# Patient Record
Sex: FEMALE | Race: WHITE | NOT HISPANIC OR LATINO | Employment: PART TIME | URBAN - METROPOLITAN AREA
[De-identification: names, ages, dates, MRNs, and addresses within clinical notes are randomized per-mention and may not be internally consistent; named-entity substitution may affect disease eponyms.]

---

## 2017-01-20 ENCOUNTER — COMMUNICATION - HEALTHEAST (OUTPATIENT)
Dept: FAMILY MEDICINE | Facility: CLINIC | Age: 22
End: 2017-01-20

## 2017-01-20 ENCOUNTER — AMBULATORY - HEALTHEAST (OUTPATIENT)
Dept: LAB | Facility: CLINIC | Age: 22
End: 2017-01-20

## 2017-01-20 DIAGNOSIS — F98.8 ADD (ATTENTION DEFICIT DISORDER): ICD-10-CM

## 2017-01-20 DIAGNOSIS — F98.8 ADD (ATTENTION DEFICIT DISORDER) WITHOUT HYPERACTIVITY: ICD-10-CM

## 2017-02-20 ENCOUNTER — COMMUNICATION - HEALTHEAST (OUTPATIENT)
Dept: FAMILY MEDICINE | Facility: CLINIC | Age: 22
End: 2017-02-20

## 2017-02-20 DIAGNOSIS — F98.8 ADD (ATTENTION DEFICIT DISORDER): ICD-10-CM

## 2017-02-21 ENCOUNTER — COMMUNICATION - HEALTHEAST (OUTPATIENT)
Dept: FAMILY MEDICINE | Facility: CLINIC | Age: 22
End: 2017-02-21

## 2017-02-22 ENCOUNTER — COMMUNICATION - HEALTHEAST (OUTPATIENT)
Dept: FAMILY MEDICINE | Facility: CLINIC | Age: 22
End: 2017-02-22

## 2017-02-22 DIAGNOSIS — F98.8 ADD (ATTENTION DEFICIT DISORDER): ICD-10-CM

## 2017-03-27 ENCOUNTER — COMMUNICATION - HEALTHEAST (OUTPATIENT)
Dept: FAMILY MEDICINE | Facility: CLINIC | Age: 22
End: 2017-03-27

## 2017-03-27 DIAGNOSIS — F98.8 ADD (ATTENTION DEFICIT DISORDER): ICD-10-CM

## 2017-05-23 ENCOUNTER — COMMUNICATION - HEALTHEAST (OUTPATIENT)
Dept: FAMILY MEDICINE | Facility: CLINIC | Age: 22
End: 2017-05-23

## 2017-05-23 DIAGNOSIS — F98.8 ADD (ATTENTION DEFICIT DISORDER): ICD-10-CM

## 2017-06-29 ENCOUNTER — OFFICE VISIT - HEALTHEAST (OUTPATIENT)
Dept: FAMILY MEDICINE | Facility: CLINIC | Age: 22
End: 2017-06-29

## 2017-06-29 DIAGNOSIS — F98.8 ADD (ATTENTION DEFICIT DISORDER) WITHOUT HYPERACTIVITY: ICD-10-CM

## 2017-08-04 ENCOUNTER — COMMUNICATION - HEALTHEAST (OUTPATIENT)
Dept: SCHEDULING | Facility: CLINIC | Age: 22
End: 2017-08-04

## 2017-08-04 DIAGNOSIS — F98.8 ADD (ATTENTION DEFICIT DISORDER) WITHOUT HYPERACTIVITY: ICD-10-CM

## 2017-08-07 ENCOUNTER — COMMUNICATION - HEALTHEAST (OUTPATIENT)
Dept: FAMILY MEDICINE | Facility: CLINIC | Age: 22
End: 2017-08-07

## 2017-08-14 ENCOUNTER — RECORDS - HEALTHEAST (OUTPATIENT)
Dept: ADMINISTRATIVE | Facility: OTHER | Age: 22
End: 2017-08-14

## 2017-09-09 ENCOUNTER — COMMUNICATION - HEALTHEAST (OUTPATIENT)
Dept: SCHEDULING | Facility: CLINIC | Age: 22
End: 2017-09-09

## 2017-09-09 DIAGNOSIS — F98.8 ADD (ATTENTION DEFICIT DISORDER) WITHOUT HYPERACTIVITY: ICD-10-CM

## 2017-10-09 ENCOUNTER — COMMUNICATION - HEALTHEAST (OUTPATIENT)
Dept: FAMILY MEDICINE | Facility: CLINIC | Age: 22
End: 2017-10-09

## 2017-10-09 DIAGNOSIS — F98.8 ADD (ATTENTION DEFICIT DISORDER) WITHOUT HYPERACTIVITY: ICD-10-CM

## 2017-11-20 ENCOUNTER — COMMUNICATION - HEALTHEAST (OUTPATIENT)
Dept: FAMILY MEDICINE | Facility: CLINIC | Age: 22
End: 2017-11-20

## 2017-11-20 DIAGNOSIS — F98.8 ADD (ATTENTION DEFICIT DISORDER) WITHOUT HYPERACTIVITY: ICD-10-CM

## 2017-11-24 ENCOUNTER — AMBULATORY - HEALTHEAST (OUTPATIENT)
Dept: FAMILY MEDICINE | Facility: CLINIC | Age: 22
End: 2017-11-24

## 2017-11-24 DIAGNOSIS — F98.8 ADD (ATTENTION DEFICIT DISORDER) WITHOUT HYPERACTIVITY: ICD-10-CM

## 2017-12-28 ENCOUNTER — COMMUNICATION - HEALTHEAST (OUTPATIENT)
Dept: FAMILY MEDICINE | Facility: CLINIC | Age: 22
End: 2017-12-28

## 2017-12-28 DIAGNOSIS — F98.8 ADD (ATTENTION DEFICIT DISORDER) WITHOUT HYPERACTIVITY: ICD-10-CM

## 2018-01-06 ENCOUNTER — COMMUNICATION - HEALTHEAST (OUTPATIENT)
Dept: FAMILY MEDICINE | Facility: CLINIC | Age: 23
End: 2018-01-06

## 2018-01-06 DIAGNOSIS — F98.8 ADD (ATTENTION DEFICIT DISORDER) WITHOUT HYPERACTIVITY: ICD-10-CM

## 2018-01-09 ENCOUNTER — COMMUNICATION - HEALTHEAST (OUTPATIENT)
Dept: FAMILY MEDICINE | Facility: CLINIC | Age: 23
End: 2018-01-09

## 2018-02-07 ENCOUNTER — COMMUNICATION - HEALTHEAST (OUTPATIENT)
Dept: FAMILY MEDICINE | Facility: CLINIC | Age: 23
End: 2018-02-07

## 2018-02-07 DIAGNOSIS — F98.8 ADD (ATTENTION DEFICIT DISORDER) WITHOUT HYPERACTIVITY: ICD-10-CM

## 2018-03-02 ENCOUNTER — OFFICE VISIT - HEALTHEAST (OUTPATIENT)
Dept: FAMILY MEDICINE | Facility: CLINIC | Age: 23
End: 2018-03-02

## 2018-03-02 DIAGNOSIS — N92.0 UNUSUALLY FREQUENT MENSES: ICD-10-CM

## 2018-03-02 DIAGNOSIS — Z11.3 ROUTINE SCREENING FOR STI (SEXUALLY TRANSMITTED INFECTION): ICD-10-CM

## 2018-03-02 DIAGNOSIS — F98.8 ADD (ATTENTION DEFICIT DISORDER) WITHOUT HYPERACTIVITY: ICD-10-CM

## 2018-03-02 LAB — TSH SERPL DL<=0.005 MIU/L-ACNC: 1.16 UIU/ML (ref 0.3–5)

## 2018-03-05 LAB
C TRACH DNA SPEC QL PROBE+SIG AMP: NEGATIVE
N GONORRHOEA DNA SPEC QL NAA+PROBE: NEGATIVE

## 2018-03-21 ENCOUNTER — COMMUNICATION - HEALTHEAST (OUTPATIENT)
Dept: FAMILY MEDICINE | Facility: CLINIC | Age: 23
End: 2018-03-21

## 2018-03-21 DIAGNOSIS — F98.8 ADD (ATTENTION DEFICIT DISORDER) WITHOUT HYPERACTIVITY: ICD-10-CM

## 2018-04-16 ENCOUNTER — COMMUNICATION - HEALTHEAST (OUTPATIENT)
Dept: FAMILY MEDICINE | Facility: CLINIC | Age: 23
End: 2018-04-16

## 2018-04-16 DIAGNOSIS — F98.8 ADD (ATTENTION DEFICIT DISORDER) WITHOUT HYPERACTIVITY: ICD-10-CM

## 2018-05-05 ENCOUNTER — RECORDS - HEALTHEAST (OUTPATIENT)
Dept: ADMINISTRATIVE | Facility: OTHER | Age: 23
End: 2018-05-05

## 2018-05-06 ENCOUNTER — RECORDS - HEALTHEAST (OUTPATIENT)
Dept: ADMINISTRATIVE | Facility: OTHER | Age: 23
End: 2018-05-06

## 2018-05-20 ENCOUNTER — COMMUNICATION - HEALTHEAST (OUTPATIENT)
Dept: FAMILY MEDICINE | Facility: CLINIC | Age: 23
End: 2018-05-20

## 2018-05-20 DIAGNOSIS — F98.8 ADD (ATTENTION DEFICIT DISORDER) WITHOUT HYPERACTIVITY: ICD-10-CM

## 2018-06-27 ENCOUNTER — COMMUNICATION - HEALTHEAST (OUTPATIENT)
Dept: FAMILY MEDICINE | Facility: CLINIC | Age: 23
End: 2018-06-27

## 2018-06-27 DIAGNOSIS — F98.8 ADD (ATTENTION DEFICIT DISORDER) WITHOUT HYPERACTIVITY: ICD-10-CM

## 2018-08-04 ENCOUNTER — COMMUNICATION - HEALTHEAST (OUTPATIENT)
Dept: FAMILY MEDICINE | Facility: CLINIC | Age: 23
End: 2018-08-04

## 2018-08-04 DIAGNOSIS — F98.8 ADD (ATTENTION DEFICIT DISORDER) WITHOUT HYPERACTIVITY: ICD-10-CM

## 2018-09-15 ENCOUNTER — COMMUNICATION - HEALTHEAST (OUTPATIENT)
Dept: FAMILY MEDICINE | Facility: CLINIC | Age: 23
End: 2018-09-15

## 2018-09-15 DIAGNOSIS — F98.8 ADD (ATTENTION DEFICIT DISORDER) WITHOUT HYPERACTIVITY: ICD-10-CM

## 2018-10-20 ENCOUNTER — COMMUNICATION - HEALTHEAST (OUTPATIENT)
Dept: FAMILY MEDICINE | Facility: CLINIC | Age: 23
End: 2018-10-20

## 2018-10-20 DIAGNOSIS — F98.8 ADD (ATTENTION DEFICIT DISORDER) WITHOUT HYPERACTIVITY: ICD-10-CM

## 2018-11-20 ENCOUNTER — COMMUNICATION - HEALTHEAST (OUTPATIENT)
Dept: FAMILY MEDICINE | Facility: CLINIC | Age: 23
End: 2018-11-20

## 2018-11-20 DIAGNOSIS — F98.8 ADD (ATTENTION DEFICIT DISORDER) WITHOUT HYPERACTIVITY: ICD-10-CM

## 2018-12-20 ENCOUNTER — COMMUNICATION - HEALTHEAST (OUTPATIENT)
Dept: FAMILY MEDICINE | Facility: CLINIC | Age: 23
End: 2018-12-20

## 2018-12-20 DIAGNOSIS — F98.8 ADD (ATTENTION DEFICIT DISORDER) WITHOUT HYPERACTIVITY: ICD-10-CM

## 2019-01-30 ENCOUNTER — COMMUNICATION - HEALTHEAST (OUTPATIENT)
Dept: FAMILY MEDICINE | Facility: CLINIC | Age: 24
End: 2019-01-30

## 2019-01-30 DIAGNOSIS — F98.8 ADD (ATTENTION DEFICIT DISORDER) WITHOUT HYPERACTIVITY: ICD-10-CM

## 2019-02-12 ENCOUNTER — OFFICE VISIT - HEALTHEAST (OUTPATIENT)
Dept: FAMILY MEDICINE | Facility: CLINIC | Age: 24
End: 2019-02-12

## 2019-02-12 DIAGNOSIS — Z79.899 CONTROLLED SUBSTANCE AGREEMENT SIGNED: ICD-10-CM

## 2019-02-12 DIAGNOSIS — F98.8 ADD (ATTENTION DEFICIT DISORDER) WITHOUT HYPERACTIVITY: ICD-10-CM

## 2019-03-12 ENCOUNTER — COMMUNICATION - HEALTHEAST (OUTPATIENT)
Dept: FAMILY MEDICINE | Facility: CLINIC | Age: 24
End: 2019-03-12

## 2019-03-12 DIAGNOSIS — F98.8 ADD (ATTENTION DEFICIT DISORDER) WITHOUT HYPERACTIVITY: ICD-10-CM

## 2019-05-02 ENCOUNTER — COMMUNICATION - HEALTHEAST (OUTPATIENT)
Dept: FAMILY MEDICINE | Facility: CLINIC | Age: 24
End: 2019-05-02

## 2019-05-02 DIAGNOSIS — F98.8 ADD (ATTENTION DEFICIT DISORDER) WITHOUT HYPERACTIVITY: ICD-10-CM

## 2019-05-10 ENCOUNTER — AMBULATORY - HEALTHEAST (OUTPATIENT)
Dept: LAB | Facility: CLINIC | Age: 24
End: 2019-05-10

## 2019-05-10 DIAGNOSIS — Z79.899 CONTROLLED SUBSTANCE AGREEMENT SIGNED: ICD-10-CM

## 2019-05-12 LAB
AMPHETAMINES, URN, SCREEN: POSITIVE NG/ML
BARBITURATES, URN, SCREEN: NEGATIVE NG/ML
BENZODIAZEPINES, URN, SCREEN: NEGATIVE NG/ML
BUPRENORPHINE, URN, SCREEN: NEGATIVE NG/ML
CARISOPRODOL, URN, SCREEN: NEGATIVE NG/ML
COCAINE, URN, SCREEN: NEGATIVE NG/ML
CREAT UR-MCNC: 174.2 MG/DL (ref 20–400)
ETHYL GLUCURONIDE SCREEN W/RFLX, URINE: POSITIVE NG/ML
FENTANYL, URN, SCREEN: NEGATIVE NG/ML
MEPERIDINE, URN, SCREEN: NEGATIVE NG/ML
METHADONE, URN, SCREEN: NEGATIVE NG/ML
OPIATES, URN, SCREEN: NEGATIVE NG/ML
OXYCODONE/OXYMORPHONE, URN, SCREEN: NEGATIVE NG/ML
PHENCYCLIDINE, URN, SCREEN: NEGATIVE NG/ML
PROPOXYPHENE, URN, SCREEN: NEGATIVE NG/ML
SCREEN, URINE INTERPRETATION: NORMAL
TAPENTADOL, URN, SCREEN: NEGATIVE NG/ML
THC, URN, SCREEN: NEGATIVE NG/ML
TRAMADOL, URN, SCREEN: NEGATIVE NG/ML
ZOLPIDEM, URN, SCREEN: NEGATIVE NG/ML

## 2019-05-14 LAB
ETHYL GLUCURONIDE UR CFM-MCNC: NORMAL NG/ML
ETHYL SULFATE UR CFM-MCNC: NORMAL NG/ML

## 2019-05-16 LAB
AMPHET UR-MCNC: 1778 NG/ML
MDA UR-MCNC: <200 NG/ML
MDEA UR-MCNC: <200 NG/ML
MDMA UR-MCNC: <200 NG/ML
METHAMPHET UR-MCNC: <200 NG/ML
PHENTERMINE UR CFM-MCNC: <200 NG/ML

## 2019-06-08 ENCOUNTER — COMMUNICATION - HEALTHEAST (OUTPATIENT)
Dept: FAMILY MEDICINE | Facility: CLINIC | Age: 24
End: 2019-06-08

## 2019-06-08 DIAGNOSIS — F98.8 ADD (ATTENTION DEFICIT DISORDER) WITHOUT HYPERACTIVITY: ICD-10-CM

## 2019-07-26 ENCOUNTER — COMMUNICATION - HEALTHEAST (OUTPATIENT)
Dept: FAMILY MEDICINE | Facility: CLINIC | Age: 24
End: 2019-07-26

## 2019-07-26 DIAGNOSIS — F98.8 ADD (ATTENTION DEFICIT DISORDER) WITHOUT HYPERACTIVITY: ICD-10-CM

## 2019-08-22 ENCOUNTER — COMMUNICATION - HEALTHEAST (OUTPATIENT)
Dept: SCHEDULING | Facility: CLINIC | Age: 24
End: 2019-08-22

## 2019-09-02 ENCOUNTER — COMMUNICATION - HEALTHEAST (OUTPATIENT)
Dept: FAMILY MEDICINE | Facility: CLINIC | Age: 24
End: 2019-09-02

## 2019-09-02 DIAGNOSIS — F98.8 ADD (ATTENTION DEFICIT DISORDER) WITHOUT HYPERACTIVITY: ICD-10-CM

## 2019-10-07 ENCOUNTER — COMMUNICATION - HEALTHEAST (OUTPATIENT)
Dept: FAMILY MEDICINE | Facility: CLINIC | Age: 24
End: 2019-10-07

## 2019-10-07 DIAGNOSIS — F98.8 ADD (ATTENTION DEFICIT DISORDER) WITHOUT HYPERACTIVITY: ICD-10-CM

## 2019-11-13 ENCOUNTER — COMMUNICATION - HEALTHEAST (OUTPATIENT)
Dept: FAMILY MEDICINE | Facility: CLINIC | Age: 24
End: 2019-11-13

## 2019-11-13 DIAGNOSIS — F98.8 ADD (ATTENTION DEFICIT DISORDER) WITHOUT HYPERACTIVITY: ICD-10-CM

## 2019-11-13 DIAGNOSIS — Z79.899 CONTROLLED SUBSTANCE AGREEMENT SIGNED: ICD-10-CM

## 2019-12-16 ENCOUNTER — COMMUNICATION - HEALTHEAST (OUTPATIENT)
Dept: FAMILY MEDICINE | Facility: CLINIC | Age: 24
End: 2019-12-16

## 2019-12-16 DIAGNOSIS — F98.8 ADD (ATTENTION DEFICIT DISORDER) WITHOUT HYPERACTIVITY: ICD-10-CM

## 2020-01-19 ENCOUNTER — COMMUNICATION - HEALTHEAST (OUTPATIENT)
Dept: FAMILY MEDICINE | Facility: CLINIC | Age: 25
End: 2020-01-19

## 2020-01-19 DIAGNOSIS — F98.8 ADD (ATTENTION DEFICIT DISORDER) WITHOUT HYPERACTIVITY: ICD-10-CM

## 2020-02-28 ENCOUNTER — OFFICE VISIT - HEALTHEAST (OUTPATIENT)
Dept: FAMILY MEDICINE | Facility: CLINIC | Age: 25
End: 2020-02-28

## 2020-02-28 DIAGNOSIS — Z12.4 PAPANICOLAOU SMEAR FOR CERVICAL CANCER SCREENING: ICD-10-CM

## 2020-02-28 DIAGNOSIS — Z79.899 CONTROLLED SUBSTANCE AGREEMENT SIGNED: ICD-10-CM

## 2020-02-28 DIAGNOSIS — Z00.00 ROUTINE GENERAL MEDICAL EXAMINATION AT A HEALTH CARE FACILITY: ICD-10-CM

## 2020-02-28 DIAGNOSIS — F98.8 ADD (ATTENTION DEFICIT DISORDER) WITHOUT HYPERACTIVITY: ICD-10-CM

## 2020-02-28 DIAGNOSIS — Z11.3 SCREENING FOR STD (SEXUALLY TRANSMITTED DISEASE): ICD-10-CM

## 2020-02-28 LAB — HIV 1+2 AB+HIV1 P24 AG SERPL QL IA: NEGATIVE

## 2020-02-28 ASSESSMENT — MIFFLIN-ST. JEOR: SCORE: 1354.83

## 2020-03-01 LAB
6MAM UR QL: NOT DETECTED
7AMINOCLONAZEPAM UR QL: NOT DETECTED
A-OH ALPRAZ UR QL: NOT DETECTED
ALPRAZ UR QL: NOT DETECTED
AMPHET UR QL SCN: PRESENT
BARBITURATES UR QL: NOT DETECTED
BUPRENORPHINE UR QL: NOT DETECTED
BZE UR QL: NOT DETECTED
CARBOXYTHC UR QL: NOT DETECTED
CARISOPRODOL UR QL: NOT DETECTED
CLONAZEPAM UR QL: NOT DETECTED
CODEINE UR QL: NOT DETECTED
CREAT UR-MCNC: 80.9 MG/DL (ref 20–400)
DIAZEPAM UR QL: NOT DETECTED
ETHYL GLUCURONIDE UR QL: PRESENT
FENTANYL UR QL: NOT DETECTED
HYDROCODONE UR QL: NOT DETECTED
HYDROMORPHONE UR QL: NOT DETECTED
LORAZEPAM UR QL: NOT DETECTED
MDA UR QL: NOT DETECTED
MDEA UR QL: NOT DETECTED
MDMA UR QL: NOT DETECTED
ME-PHENIDATE UR QL: NOT DETECTED
MEPERIDINE UR QL: NOT DETECTED
METHADONE UR QL: NOT DETECTED
METHAMPHET UR QL: NOT DETECTED
MIDAZOLAM UR QL SCN: NOT DETECTED
MORPHINE UR QL: NOT DETECTED
NORBUPRENORPHINE UR QL CFM: NOT DETECTED
NORDIAZEPAM UR QL: NOT DETECTED
NORFENTANYL UR QL: NOT DETECTED
NORHYDROCODONE UR QL CFM: NOT DETECTED
NOROXYCODONE UR QL CFM: NOT DETECTED
NOROXYMORPHONE UR QL SCN: NOT DETECTED
OXAZEPAM UR QL: NOT DETECTED
OXYCODONE UR QL: NOT DETECTED
OXYMORPHONE UR QL: NOT DETECTED
PATHOLOGY STUDY: NORMAL
PCP UR QL: NOT DETECTED
PHENTERMINE UR QL: NOT DETECTED
PROPOXYPH UR QL: NOT DETECTED
SERVICE CMNT-IMP: NORMAL
TAPENTADOL UR QL SCN: NOT DETECTED
TAPENTADOL UR QL SCN: NOT DETECTED
TEMAZEPAM UR QL: NOT DETECTED
TRAMADOL UR QL: NOT DETECTED
ZOLPIDEM UR QL: NOT DETECTED

## 2020-03-02 LAB
BKR LAB AP ABNORMAL BLEEDING: NORMAL
BKR LAB AP BIRTH CONTROL/HORMONES: NORMAL
BKR LAB AP CERVICAL APPEARANCE: NORMAL
BKR LAB AP GYN ADEQUACY: NORMAL
BKR LAB AP GYN INTERPRETATION: NORMAL
BKR LAB AP HPV REFLEX: NO
BKR LAB AP LMP: NORMAL
BKR LAB AP PATIENT STATUS: NORMAL
BKR LAB AP PREVIOUS ABNORMAL: NO
BKR LAB AP PREVIOUS NORMAL: 2016
C TRACH DNA SPEC QL PROBE+SIG AMP: NEGATIVE
HIGH RISK?: NO
N GONORRHOEA DNA SPEC QL NAA+PROBE: NEGATIVE
PATH REPORT.COMMENTS IMP SPEC: NORMAL
RESULT FLAG (HE HISTORICAL CONVERSION): NORMAL

## 2020-03-05 ENCOUNTER — OFFICE VISIT - HEALTHEAST (OUTPATIENT)
Dept: FAMILY MEDICINE | Facility: CLINIC | Age: 25
End: 2020-03-05

## 2020-03-05 DIAGNOSIS — Z30.46 ENCOUNTER FOR NEXPLANON REMOVAL: ICD-10-CM

## 2020-03-05 DIAGNOSIS — Z30.011 ENCOUNTER FOR INITIAL PRESCRIPTION OF CONTRACEPTIVE PILLS: ICD-10-CM

## 2020-04-01 ENCOUNTER — COMMUNICATION - HEALTHEAST (OUTPATIENT)
Dept: FAMILY MEDICINE | Facility: CLINIC | Age: 25
End: 2020-04-01

## 2020-04-01 DIAGNOSIS — F98.8 ADD (ATTENTION DEFICIT DISORDER) WITHOUT HYPERACTIVITY: ICD-10-CM

## 2020-04-20 ENCOUNTER — COMMUNICATION - HEALTHEAST (OUTPATIENT)
Dept: FAMILY MEDICINE | Facility: CLINIC | Age: 25
End: 2020-04-20

## 2020-04-20 DIAGNOSIS — F98.8 ADD (ATTENTION DEFICIT DISORDER) WITHOUT HYPERACTIVITY: ICD-10-CM

## 2020-04-29 ENCOUNTER — COMMUNICATION - HEALTHEAST (OUTPATIENT)
Dept: FAMILY MEDICINE | Facility: CLINIC | Age: 25
End: 2020-04-29

## 2020-04-29 DIAGNOSIS — Z30.011 ENCOUNTER FOR INITIAL PRESCRIPTION OF CONTRACEPTIVE PILLS: ICD-10-CM

## 2020-04-30 RX ORDER — NORGESTIMATE AND ETHINYL ESTRADIOL
KIT
Qty: 28 TABLET | Refills: 1 | Status: SHIPPED | OUTPATIENT
Start: 2020-04-30 | End: 2021-09-07

## 2020-06-24 ENCOUNTER — COMMUNICATION - HEALTHEAST (OUTPATIENT)
Dept: FAMILY MEDICINE | Facility: CLINIC | Age: 25
End: 2020-06-24

## 2020-06-24 DIAGNOSIS — F98.8 ADD (ATTENTION DEFICIT DISORDER) WITHOUT HYPERACTIVITY: ICD-10-CM

## 2020-09-04 ENCOUNTER — COMMUNICATION - HEALTHEAST (OUTPATIENT)
Dept: FAMILY MEDICINE | Facility: CLINIC | Age: 25
End: 2020-09-04

## 2020-09-04 DIAGNOSIS — F98.8 ADD (ATTENTION DEFICIT DISORDER) WITHOUT HYPERACTIVITY: ICD-10-CM

## 2020-10-09 ENCOUNTER — COMMUNICATION - HEALTHEAST (OUTPATIENT)
Dept: FAMILY MEDICINE | Facility: CLINIC | Age: 25
End: 2020-10-09

## 2020-10-09 DIAGNOSIS — F98.8 ADD (ATTENTION DEFICIT DISORDER) WITHOUT HYPERACTIVITY: ICD-10-CM

## 2020-10-12 ENCOUNTER — COMMUNICATION - HEALTHEAST (OUTPATIENT)
Dept: FAMILY MEDICINE | Facility: CLINIC | Age: 25
End: 2020-10-12

## 2020-10-12 DIAGNOSIS — F98.8 ADD (ATTENTION DEFICIT DISORDER) WITHOUT HYPERACTIVITY: ICD-10-CM

## 2020-11-09 ENCOUNTER — COMMUNICATION - HEALTHEAST (OUTPATIENT)
Dept: FAMILY MEDICINE | Facility: CLINIC | Age: 25
End: 2020-11-09

## 2020-11-09 DIAGNOSIS — F98.8 ADD (ATTENTION DEFICIT DISORDER) WITHOUT HYPERACTIVITY: ICD-10-CM

## 2020-11-13 ENCOUNTER — COMMUNICATION - HEALTHEAST (OUTPATIENT)
Dept: FAMILY MEDICINE | Facility: CLINIC | Age: 25
End: 2020-11-13

## 2020-11-13 DIAGNOSIS — F98.8 ADD (ATTENTION DEFICIT DISORDER) WITHOUT HYPERACTIVITY: ICD-10-CM

## 2020-12-07 ENCOUNTER — VIRTUAL VISIT (OUTPATIENT)
Dept: FAMILY MEDICINE | Facility: OTHER | Age: 25
End: 2020-12-07

## 2020-12-08 ENCOUNTER — AMBULATORY - HEALTHEAST (OUTPATIENT)
Dept: FAMILY MEDICINE | Facility: CLINIC | Age: 25
End: 2020-12-08

## 2020-12-08 DIAGNOSIS — Z20.822 SUSPECTED COVID-19 VIRUS INFECTION: ICD-10-CM

## 2020-12-08 NOTE — PROGRESS NOTES
"Date: 2020 18:08:51  Clinician: Marco A Verdugo  Clinician NPI: [id number]  Patient: Aidee Cortes  Patient : 1995  Patient Address: [address], [place] [address]  Patient Phone: [phone]  Visit Protocol: URI  Patient Summary:  Aidee is a 25 year old ( : 1995 ) female who initiated a [facility] Visit for COVID-19 (Coronavirus) evaluation and screening. When asked the question \"Please sign me up to receive news, health information and promotions from [facility].\", Aidee responded \"No\".    Aidee states her symptoms started 1-2 days ago.   Her symptoms consist of a headache, nasal congestion, rhinitis, facial pain or pressure, myalgia, chills, malaise, and a sore throat. She is experiencing mild difficulty breathing with activities but can speak normally in full sentences. Aidee also feels feverish.   Symptom details     Nasal secretions: The color of her mucus is green, blood-tinged, yellow, and white.    Sore throat: Aidee reports having moderate throat pain (4-6 on a 10 point pain scale), does not have exudate on her tonsils, and can swallow liquids. The lymph nodes in her neck are not enlarged. A rash has not appeared on the skin since the sore throat started.     Temperature: Her current temperature is 102.0 degrees Fahrenheit. Aidee has had a temperature over 100 degrees Fahrenheit for 1-2 days.     Facial pain or pressure: The facial pain or pressure feels worse when bending over or leaning forward.     Headache: She states the headache is moderate (4-6 on a 10 point pain scale).      Aidee denies having ear pain, wheezing, enlarged lymph nodes, cough, nausea, vomiting, teeth pain, ageusia, diarrhea, and anosmia. She also denies taking antibiotic medication in the past month, having recent facial or sinus surgery in the past 60 days, and having a sinus infection within the past year.   Precipitating events  Within the past week, Aidee has not been exposed to someone with " strep throat. She has not recently been exposed to someone with influenza. Aidee has been in close contact with the following high risk individuals: adults 65 or older.   Pertinent COVID-19 (Coronavirus) information  Aidee does not work or volunteer as healthcare worker or a . In the past 14 days, Aidee has not worked or volunteered at a healthcare facility or group living setting.   In the past 14 days, she also has not lived in a congregate living setting.   Aidee has not had a close contact with a laboratory-confirmed COVID-19 patient within 14 days of symptom onset.    Since December 2019, Aidee has not been tested for COVID-19 and has not had upper respiratory infection or influenza-like illness.   Pertinent medical history  She has not been told by her provider to avoid NSAIDs.   Aidee does not get yeast infections when she takes antibiotics.   Aidee does not have diabetes. She denies having immunosuppressive conditions (e.g., chemotherapy, HIV, organ transplant, long-term use of steroids or other immunosuppressive medications, splenectomy). She does not have severe COPD and congestive heart failure. She does not have asthma.   Aidee needs a return to work/school note.   Weight: 135 lbs   Aidee does not smoke or use smokeless tobacco.   She denies pregnancy and denies breastfeeding. She has menstruated in the past month.   Weight: 135 lbs    MEDICATIONS: Tylenol oral, Ibuprofen IB oral, Adderall oral, Adderall XR oral, ALLERGIES: NKDA  Clinician Response:  Dear Aidee,   Your symptoms show that you may have coronavirus (COVID-19). This illness can cause fever, cough and trouble breathing. Many people get a mild case and get better on their own. Some people can get very sick.  What should I do?  We would like to test you for this virus.   1. Please call [phone] to schedule your visit. Explain that you were referred by [facility] to have a COVID-19 test. Be ready to share your [facility]  "visit ID number.  * If you need to schedule in [facility] please call [phone] or for [employer] employees please call [phone].  * If you need to schedule in the [place] area please call [phone]. [place] employees call [phone].  The following will serve as your written order for this COVID Test, ordered by me, for the indication of suspected COVID [Z20.828]: The test will be ordered in [employer], our electronic health record, after you are scheduled. It will show as ordered and authorized by Rylan Pineda MD.  Order: COVID-19 (Coronavirus) PCR for SYMPTOMATIC testing from [facility].   2. When it's time for your COVID test:  Stay at least 6 feet away from others. (If someone will drive you to your test, stay in the backseat, as far away from the  as you can.)   Cover your mouth and nose with a mask, tissue or washcloth.  Go straight to the testing site. Don't make any stops on the way there or back.      3.Starting now: Stay home and away from others (self-isolate) until:   You've had no fever---and no medicine that reduces fever---for one full day (24 hours). And...   Your other symptoms have gotten better. For example, your cough or breathing has improved. And...   At least 10 days have passed since your symptoms started.       During this time, don't leave the house except for testing or medical care.   Stay in your own room, even for meals. Use your own bathroom if you can.   Stay away from others in your home. No hugging, kissing or shaking hands. No visitors.  Don't go to work, school or anywhere else.    Clean \"high touch\" surfaces often (doorknobs, counters, handles, etc.). Use a household cleaning spray or wipes. You'll find a full list of  on the EPA website: www.epa.gov/pesticide-registration/list-n-disinfectants-use-against-sars-cov-2.   Cover your mouth and nose with a mask, tissue or washcloth to avoid spreading germs.  Wash your hands and face often. Use soap and water.  " Caregivers in these groups are at risk for severe illness due to COVID-19:  o People 65 years and older  o People who live in a nursing home or long-term care facility  o People with chronic disease (lung, heart, cancer, diabetes, kidney, liver, immunologic)  o People who have a weakened immune system, including those who:   Are in cancer treatment  Take medicine that weakens the immune system, such as corticosteroids  Had a bone marrow or organ transplant  Have an immune deficiency  Have poorly controlled HIV or AIDS  Are obese (body mass index of 40 or higher)  Smoke regularly   o Caregivers should wear gloves while washing dishes, handling laundry and cleaning bedrooms and bathrooms.  o Use caution when washing and drying laundry: Don't shake dirty laundry, and use the warmest water setting that you can.  o For more tips, go to www.cdc.gov/coronavirus/2019-ncov/downloads/10Things.pdf.    How can I take care of myself?    Get lots of rest. Drink extra fluids (unless a doctor has told you not to).   Take Tylenol (acetaminophen) for fever or pain. If you have liver or kidney problems, ask your family doctor if it's okay to take Tylenol.   Adults can take either:    650 mg (two 325 mg pills) every 4 to 6 hours, or...   1,000 mg (two 500 mg pills) every 8 hours as needed.    Note: Don't take more than 3,000 mg in one day. Acetaminophen is found in many medicines (both prescribed and over-the-counter medicines). Read all labels to be sure you don't take too much.   For children, check the Tylenol bottle for the right dose. The dose is based on the child's age or weight.    If you have other health problems (like cancer, heart failure, an organ transplant or severe kidney disease): Call your specialty clinic if you don't feel better in the next 2 days.       Know when to call 911. Emergency warning signs include:    Trouble breathing or shortness of breath Pain or pressure in the chest that doesn't go away Feeling  confused like you haven't felt before, or not being able to wake up Bluish-colored lips or face.  Where can I get more information?   [facility] -- About COVID-19: [email or web address]   CDC -- What to Do If You're Sick: www.cdc.gov/coronavirus/2019-ncov/about/steps-when-sick.html   CDC -- Ending Home Isolation: www.cdc.gov/coronavirus/2019-ncov/hcp/disposition-in-home-patients.html   [facility] -- Caring for Someone: www.cdc.gov/coronavirus/2019-ncov/if-you-are-sick/care-for-someone.html   [facility] -- Interim Guidance for Hospital Discharge to Home: www.[facility].[place]/diseases/coronavirus/hcp/hospdischarge.pdf   [facility] clinical trials (COVID-19 research studies): clinicalaffairs.[facility]-clinical-trials    Below are the COVID-19 hotlines at the [facility] of [facility] ([facility]). Interpreters are available.    For health questions: Call 212-418-4435 or 1-233.978.1630 (7 a.m. to 7 p.m.) For questions about schools and childcare: Call 462-710-5350 or 1-675.740.9761 (7 a.m. to 7 p.m.)    Diagnosis: Contact with and (suspected) exposure to other viral communicable diseases  Diagnosis ICD: Z20.828

## 2020-12-09 ENCOUNTER — RECORDS - HEALTHEAST (OUTPATIENT)
Dept: ADMINISTRATIVE | Facility: OTHER | Age: 25
End: 2020-12-09

## 2020-12-10 ENCOUNTER — COMMUNICATION - HEALTHEAST (OUTPATIENT)
Dept: SCHEDULING | Facility: CLINIC | Age: 25
End: 2020-12-10

## 2020-12-19 ENCOUNTER — COMMUNICATION - HEALTHEAST (OUTPATIENT)
Dept: FAMILY MEDICINE | Facility: CLINIC | Age: 25
End: 2020-12-19

## 2020-12-19 DIAGNOSIS — F98.8 ADD (ATTENTION DEFICIT DISORDER) WITHOUT HYPERACTIVITY: ICD-10-CM

## 2020-12-20 ENCOUNTER — HEALTH MAINTENANCE LETTER (OUTPATIENT)
Age: 25
End: 2020-12-20

## 2021-01-24 ENCOUNTER — COMMUNICATION - HEALTHEAST (OUTPATIENT)
Dept: FAMILY MEDICINE | Facility: CLINIC | Age: 26
End: 2021-01-24

## 2021-01-24 DIAGNOSIS — F98.8 ADD (ATTENTION DEFICIT DISORDER) WITHOUT HYPERACTIVITY: ICD-10-CM

## 2021-03-09 ENCOUNTER — OFFICE VISIT - HEALTHEAST (OUTPATIENT)
Dept: FAMILY MEDICINE | Facility: CLINIC | Age: 26
End: 2021-03-09

## 2021-03-09 DIAGNOSIS — F98.8 ADD (ATTENTION DEFICIT DISORDER) WITHOUT HYPERACTIVITY: ICD-10-CM

## 2021-03-09 DIAGNOSIS — Z00.00 ROUTINE GENERAL MEDICAL EXAMINATION AT A HEALTH CARE FACILITY: ICD-10-CM

## 2021-03-09 DIAGNOSIS — Z79.899 CONTROLLED SUBSTANCE AGREEMENT SIGNED: ICD-10-CM

## 2021-03-09 DIAGNOSIS — Z11.3 SCREEN FOR STD (SEXUALLY TRANSMITTED DISEASE): ICD-10-CM

## 2021-03-09 LAB
AMPHETAMINES UR QL SCN: ABNORMAL
BARBITURATES UR QL: ABNORMAL
BENZODIAZ UR QL: ABNORMAL
CANNABINOIDS UR QL SCN: ABNORMAL
COCAINE UR QL: ABNORMAL
CREAT UR-MCNC: 195.2 MG/DL
METHADONE UR QL SCN: ABNORMAL
OPIATES UR QL SCN: ABNORMAL
OXYCODONE UR QL: ABNORMAL
PCP UR QL SCN: ABNORMAL

## 2021-03-11 LAB
C TRACH DNA SPEC QL PROBE+SIG AMP: NEGATIVE
N GONORRHOEA DNA SPEC QL NAA+PROBE: NEGATIVE

## 2021-04-19 ENCOUNTER — COMMUNICATION - HEALTHEAST (OUTPATIENT)
Dept: FAMILY MEDICINE | Facility: CLINIC | Age: 26
End: 2021-04-19

## 2021-04-19 DIAGNOSIS — F98.8 ADD (ATTENTION DEFICIT DISORDER) WITHOUT HYPERACTIVITY: ICD-10-CM

## 2021-05-25 ENCOUNTER — COMMUNICATION - HEALTHEAST (OUTPATIENT)
Dept: FAMILY MEDICINE | Facility: CLINIC | Age: 26
End: 2021-05-25

## 2021-05-25 DIAGNOSIS — F98.8 ADD (ATTENTION DEFICIT DISORDER) WITHOUT HYPERACTIVITY: ICD-10-CM

## 2021-05-25 RX ORDER — DEXTROAMPHETAMINE SACCHARATE, AMPHETAMINE ASPARTATE, DEXTROAMPHETAMINE SULFATE AND AMPHETAMINE SULFATE 1.25; 1.25; 1.25; 1.25 MG/1; MG/1; MG/1; MG/1
TABLET ORAL
Qty: 30 TABLET | Refills: 0 | Status: SHIPPED | OUTPATIENT
Start: 2021-05-25 | End: 2022-02-16

## 2021-05-25 RX ORDER — DEXTROAMPHETAMINE SACCHARATE, AMPHETAMINE ASPARTATE MONOHYDRATE, DEXTROAMPHETAMINE SULFATE AND AMPHETAMINE SULFATE 3.75; 3.75; 3.75; 3.75 MG/1; MG/1; MG/1; MG/1
15 CAPSULE, EXTENDED RELEASE ORAL DAILY
Qty: 30 CAPSULE | Refills: 0 | Status: SHIPPED | OUTPATIENT
Start: 2021-05-25 | End: 2022-02-16

## 2021-05-27 VITALS
SYSTOLIC BLOOD PRESSURE: 124 MMHG | OXYGEN SATURATION: 98 % | TEMPERATURE: 98.1 F | HEART RATE: 118 BPM | DIASTOLIC BLOOD PRESSURE: 62 MMHG

## 2021-05-28 NOTE — TELEPHONE ENCOUNTER
Please let her know I filled a month, but she needs to come in for a urine test before I will provide further refills

## 2021-05-28 NOTE — TELEPHONE ENCOUNTER
"Controlled Substance Refill Request  #1 of 2  Medication Name:  Adderall XR 15 mg; daily.  Start 3/19/19 until Wed 3/18/2020; no further refills without office visit  Date Last Fill: 3/19/2019  Quantity: 30  Number of refills: 0  Controlled Substance Agreement on File: Yes: 2/12/2019   Early Request:  No  Plan Last OV:   Last office visit addressing: ADD  Date: 2/12/2019  Copy/Past OV Follow-up Plan:    \"Return in about 6 months (around 8/12/2019) for Annual physical.\"    Desire Cruz RN, BAN, Nurse Advisor, [facility]-7[facility]      Controlled Substance Refill Request  #2 of 2  Medication Name:  Adderall 5 mg; take one in the afternoon as needed; no further refills without office visit  Date Last Fill: 3/19/2019  Quantity: 30  Number of refills: 0  Controlled Substance Agreement on File: Yes: 2/12/2019  Early Request:  No  Plan Last OV:   Last office visit addressing: ADD  Date: 2/12/2019  Copy/Past OV Follow-up Plan:    \"Return in about 6 months (around 8/12/2019) for Annual physical.    Desire Cruz RN, BAN, Nurse Advisor, [facility]    "

## 2021-05-29 NOTE — TELEPHONE ENCOUNTER
Controlled Substance Refill Request  Medication:   Requested Prescriptions     Pending Prescriptions Disp Refills     dextroamphetamine-amphetamine (ADDERALL XR) 15 MG 24 hr capsule 30 capsule 0     Sig: Take 1 capsule (15 mg total) by mouth daily.     dextroamphetamine-amphetamine (ADDERALL) 5 mg Tab tablet 30 tablet 0     Sig: Take 1 tablet in the afternoon as needed     Date Last Fill: 5/3/19 both  Pharmacy: [facility]   Submit electronically to pharmacy  Controlled Substance Agreement on File:   Encounter-Level CSA Scan Date - 12/09/2016:    Scan on 12/9/2016 (below)         Last office visit: Last office visit pertaining to requested medication was .

## 2021-05-29 NOTE — TELEPHONE ENCOUNTER
Requested Prescriptions     Pending Prescriptions Disp Refills     dextroamphetamine-amphetamine (ADDERALL XR) 15 MG 24 hr capsule 30 capsule 0     Sig: Take 1 capsule (15 mg total) by mouth daily.     dextroamphetamine-amphetamine (ADDERALL) 5 mg Tab tablet 30 tablet 0     Sig: Take 1 tablet in the afternoon as needed     LM for pt to call clinic to call clinic to schedule a med check.

## 2021-05-30 NOTE — TELEPHONE ENCOUNTER
Orders being requested: Amphetamines, Urine, Quantitative      Reason service is needed/diagnosis:   Medication Refill     When are orders needed by:   ASAP   Where to send Orders: Place in Chart     Okay to leave detailed message?  Yes  Please call patient per approval so patient can obtain refill request .  Thank You

## 2021-05-30 NOTE — TELEPHONE ENCOUNTER
Controlled Substance Refill Request  Medication Name:   Requested Prescriptions     Pending Prescriptions Disp Refills     dextroamphetamine-amphetamine (ADDERALL XR) 15 MG 24 hr capsule 30 capsule 0     Sig: Take 1 capsule (15 mg total) by mouth daily.     dextroamphetamine-amphetamine (ADDERALL) 5 mg Tab tablet 30 tablet 0     Sig: Take 1 tablet in the afternoon as needed     Date Last Fill: 06/12/19  Pharmacy:  [place], [place] - [address]    Submit electronically to pharmacy  Controlled Substance Agreement Date Scanned:   Encounter-Level CSA Scan Date - 12/09/2016:    Scan on 12/9/2016 (below)         Last office visit with prescriber/PCP: 2/12/2019 Khushi Robles MD OR same dept: 2/12/2019 Khushi Robles MD OR same specialty: 2/12/2019 Khushi Robles MD  Last physical: Visit date not found Last MTM visit: Visit date not found

## 2021-05-31 VITALS — WEIGHT: 138 LBS | BODY MASS INDEX: 22.44 KG/M2

## 2021-05-31 NOTE — TELEPHONE ENCOUNTER
Implant of nexplanon in July 2016       Appt for removal in a few weeks -- between now and then, ok for intercourse?      A/P   > Yes, ok for intercourse, but have a back-up  / barrier method to prevent pregnancies       > Cont to watch implant site for any s/sx of infection (red / hot / swollen) and call back as needed if issues arise      Simba Batista RN   Triage and Medication Refills                   Reason for Disposition    Has an Implanon or Nexplanon implant and more than 3 years since it was placed    Protocols used: CONTRACEPTION - IMPLANT SYMPTOMS AND [id number]

## 2021-05-31 NOTE — TELEPHONE ENCOUNTER
Controlled Substance Refill Request  Medication:   Requested Prescriptions     Pending Prescriptions Disp Refills     dextroamphetamine-amphetamine (ADDERALL XR) 15 MG 24 hr capsule 30 capsule 0     Sig: Take 1 capsule (15 mg total) by mouth daily.     dextroamphetamine-amphetamine (ADDERALL) 5 mg Tab tablet 30 tablet 0     Sig: Take 1 tablet in the afternoon as needed     Date Last Fill: 7/26/19  Pharmacy: [facility]    Submit electronically to pharmacy  Controlled Substance Agreement on File:   Encounter-Level CSA Scan Date - 12/09/2016:    Scan on 12/9/2016 (below)         Last office visit: Last office visit pertaining to requested medication was 2/12/19.

## 2021-06-01 VITALS — WEIGHT: 130 LBS | BODY MASS INDEX: 21.14 KG/M2

## 2021-06-02 ENCOUNTER — COMMUNICATION - HEALTHEAST (OUTPATIENT)
Dept: SCHEDULING | Facility: CLINIC | Age: 26
End: 2021-06-02

## 2021-06-02 ENCOUNTER — RECORDS - HEALTHEAST (OUTPATIENT)
Dept: ADMINISTRATIVE | Facility: CLINIC | Age: 26
End: 2021-06-02

## 2021-06-02 VITALS — WEIGHT: 132.5 LBS | BODY MASS INDEX: 21.55 KG/M2

## 2021-06-02 NOTE — TELEPHONE ENCOUNTER
CSA under letter tab done 2/12/19. Urine drug screen one 5/10/19    She never rescheduled appt with me from 9/12/19.  I will give her refill today, but she  should have e visit (she can request under misc) or office visit follow up for ADHD before further refills - please let her know

## 2021-06-02 NOTE — TELEPHONE ENCOUNTER
Controlled Substance Refill Request  Medication:   Requested Prescriptions     Pending Prescriptions Disp Refills     dextroamphetamine-amphetamine (ADDERALL XR) 15 MG 24 hr capsule 30 capsule 0     Sig: Take 1 capsule (15 mg total) by mouth daily.     dextroamphetamine-amphetamine (ADDERALL) 5 mg Tab tablet 30 tablet 0     Sig: Take 1 tablet in the afternoon as needed     Date Last Fill: 9.3.19  Pharmacy: [facility]   Submit electronically to pharmacy  Controlled Substance Agreement on File:   Encounter-Level CSA Scan Date - 12/09/2016:    Scan on 12/9/2016       Last office visit: Last office visit pertaining to requested medication was 2.12.19.

## 2021-06-02 NOTE — TELEPHONE ENCOUNTER
Left message to call back for: Patient  Information to relay to patient:  Please relay MD message below.     PAUL/SONIA

## 2021-06-03 NOTE — TELEPHONE ENCOUNTER
Diagnosis ADHD  Medication : Adderall XR 15 mg, adderall 5 mg  Quantity per month: 30 each  No of refills before follow up visit: year  CSA signed : 2/12/19  Urine drug screen: 5/10/19

## 2021-06-03 NOTE — TELEPHONE ENCOUNTER
Controlled Substance Refill Request  Medication:   Requested Prescriptions     Pending Prescriptions Disp Refills     dextroamphetamine-amphetamine (ADDERALL XR) 15 MG 24 hr capsule 30 capsule 0     Sig: Take 1 capsule (15 mg total) by mouth daily.     dextroamphetamine-amphetamine (ADDERALL) 5 mg Tab tablet 30 tablet 0     Sig: Take 1 tablet in the afternoon as needed     Date Last Fill:   dextroamphetamine-amphetamine (ADDERALL XR) 15 MG 24 hr capsule 30 capsule 0 10/10/2019 10/9/2020     amphetamine (ADDERALL) 5 mg Tab tablet 30 tablet 0 10/10/2019     Pharmacy: [facility]   Submit electronically to pharmacy  Controlled Substance Agreement on File:   Encounter-Level CSA Scan Date - 12/09/2016:    Scan on 12/9/2016       Last office visit: Last office visit pertaining to requested medication was2/12/19 .

## 2021-06-04 VITALS
WEIGHT: 132 LBS | OXYGEN SATURATION: 99 % | SYSTOLIC BLOOD PRESSURE: 98 MMHG | BODY MASS INDEX: 21.63 KG/M2 | HEART RATE: 72 BPM | DIASTOLIC BLOOD PRESSURE: 68 MMHG

## 2021-06-04 VITALS
BODY MASS INDEX: 21.29 KG/M2 | OXYGEN SATURATION: 100 % | HEART RATE: 84 BPM | HEIGHT: 66 IN | DIASTOLIC BLOOD PRESSURE: 68 MMHG | WEIGHT: 132.5 LBS | SYSTOLIC BLOOD PRESSURE: 108 MMHG

## 2021-06-04 NOTE — TELEPHONE ENCOUNTER
Controlled Substance Refill Request  Medication:   Requested Prescriptions     Pending Prescriptions Disp Refills     dextroamphetamine-amphetamine (ADDERALL XR) 15 MG 24 hr capsule 30 capsule 0     Sig: Take 1 capsule (15 mg total) by mouth daily.     dextroamphetamine-amphetamine (ADDERALL) 5 mg Tab tablet 30 tablet 0     Sig: Take 1 tablet in the afternoon as needed     Date Last Fill: 11.14.19  Pharmacy: [facility]   Submit electronically to pharmacy  Controlled Substance Agreement on File:   Encounter-Level CSA Scan Date - 12/09/2016:    Scan on 12/9/2016       Last office visit: Last office visit pertaining to requested medication was 2.12.19.

## 2021-06-04 NOTE — TELEPHONE ENCOUNTER
Left message to call back for: Pt  Information to relay to patient:  I left a detailed message advising pt to contact clinic and to schedule annual exam, and that follow up appointment could be completed during this visit per Dr. Robles.    MARY ANNE, CMA

## 2021-06-04 NOTE — TELEPHONE ENCOUNTER
She overdue for annual exam - please call her to schedule - we can do her ADHD urinary frequency visit at that appt too - refills done

## 2021-06-04 NOTE — TELEPHONE ENCOUNTER
LMTCB . Please assist patient in scheduling an appointment when the patient returns the call. Thank you .  NOTE: PLEASE CLOSE THE ENCOUNTER WHEN PATIENT IS SCHEDULED.

## 2021-06-05 NOTE — TELEPHONE ENCOUNTER
30 day fill given. Aidee is due for follow up and also for preventive visit.  Please call her to schedule annual exam/pap/ADHD follow up.  She needs this visit before further refills

## 2021-06-05 NOTE — TELEPHONE ENCOUNTER
Controlled Substance Refill Request  Medication Name:   Requested Prescriptions     Pending Prescriptions Disp Refills     dextroamphetamine-amphetamine (ADDERALL XR) 15 MG 24 hr capsule 30 capsule 0     Sig: Take 1 capsule (15 mg total) by mouth daily.     dextroamphetamine-amphetamine (ADDERALL) 5 mg Tab tablet 30 tablet 0     Sig: Take 1 tablet in the afternoon as needed     Date Last Fill: 12/17/19  Requested Pharmacy: [facility]  Submit electronically to pharmacy  Controlled Substance Agreement on file:   Encounter-Level CSA Scan Date - 12/09/2016:    Scan on 12/9/2016        Last office visit:  2/12/19

## 2021-06-05 NOTE — TELEPHONE ENCOUNTER
Medication: dextroamphetamine-amphetamine (ADDERALL) 5 mg Tab tablet AND 15mg XR    Pharmacy: [facility]   Last OV: 02/12/19  Last Refill: 12/17/19  Q:30  Refills: 0    GJ/RMA

## 2021-06-05 NOTE — TELEPHONE ENCOUNTER
Patient was called and MD message was relayed. She has made an appointment for the 28th of January.

## 2021-06-06 NOTE — PROGRESS NOTES
"FEMALE PREVENTATIVE EXAM    Assessment and Plan:       1. Routine general medical examination at a health care facility    2. Papanicolaou smear for cervical cancer screening  - Gynecologic Cytology (PAP Smear)    3. Screening for STD (sexually transmitted disease)  - HIV Antigen/Antibody Screening Cascade  - Chlamydia trachomatis & Neisseria gonorrhoeae, Amplified Detection    4. ADD (attention deficit disorder) without hyperactivity  - dextroamphetamine-amphetamine (ADDERALL) 5 mg Tab tablet; Take 1 tablet in the afternoon as needed  Dispense: 30 tablet; Refill: 0  - dextroamphetamine-amphetamine (ADDERALL XR) 15 MG 24 hr capsule; Take 1 capsule (15 mg total) by mouth daily.  Dispense: 30 capsule; Refill: 0    5. Controlled substance agreement signed  For above medications  - Pain Management Drug Panel, Urine        Next follow up:  Return in about 6 months (around 8/28/2020) for follow up ADHD - may be by dheeraj.    Immunization Review  Adult Imm Review: No immunizations due today    I discussed the following with the patient:   Adult Healthy Living: Importance of regular exercise  Healthy nutrition  Contraception options      Subjective:   Chief Complaint: Aidee Cortes is an 24 y.o. female here for a preventative health visit.     HPI:  Aidee would like to have her Nexplanon out - \"I'm not crazy about it, my menses are all over the place.\" She thinks she might try the pill again. She tried the Nuvaring several years ago and did not like it    She has not other concerns    Social History:  She is a  at [employer]/rest[employer] in [facility], and has a steady partner    Healthy Habits  Are you taking a daily aspirin? No  Do you typically exercising at least 40 min, 3-4 times per week?  NO walks a lot at work  Do you usually eat at least 4 servings of fruit and vegetables a day, include whole grains and fiber and avoid regularly eating high fat foods? Yes partner is a good cook  Have you had " "an eye exam in the past two years? Yes  Do you see a dentist twice per year? no  Do you have any concerns regarding sleep? No    Safety Screen  If you own firearms, are they secured in a locked gun cabinet or with trigger locks? The patient does not own any firearms  Do you feel you are safe where you are living?: Yes (2/28/2020  9:05 AM)  Do you feel you are safe in your relationship(s)?: Yes (2/28/2020  9:05 AM)      Review of Systems:  Constitutional: negative for recent illness or change in weight  Eyes: negative for irritation and vision change  Ears, nose, mouth, throat, and face: negative for nasal congestion and sore throat  Respiratory: negative for cough and dyspnea on exertion  Cardiovascular: negative for chest pain and palpitations  Gastrointestinal: negative for abdominal pain and change in bowel habits  Genitourinary:negative for dysuria, frequency and hesitancy  Integument/breast: negative for rash  Hematologic/lymphatic: negative for bleeding and easy bruising  Musculoskeletal:negative for arthralgias and myalgias except mild shoulder pain after working sometimes  Neurological: negative for dizziness, headaches and paresthesia      Cancer Screening       Status Date      PAP SMEAR Overdue 8/12/2019      Done 8/12/2016 GYNECOLOGIC CYTOLOGY (PAP SMEAR)            History     Reviewed By Date/Time Sections Reviewed    Khushi Robles MD 2/28/2020  9:36 AM Surgical    Khushi Robles MD 2/28/2020  9:35 AM Medical, Surgical    Cheli Jose CMA 2/28/2020  9:04 AM Tobacco            Objective:   Vital Signs:   Visit Vitals  /68 (Patient Site: Right Arm, Patient Position: Sitting, Cuff Size: Adult Regular)   Pulse 84   Ht 5' 5.5\" (1.664 m)   Wt 132 lb 8 oz (60.1 kg)   LMP 02/14/2020 (Approximate)   SpO2 100%   Breastfeeding No   BMI 21.71 kg/m           PHYSICAL EXAM  General appearance - alert, well appearing, and in no distress  Mental status - normal mood, behavior, speech, dress, motor activity, " and thought processes  Eyes - pupils equal and reactive, extraocular eye movements intact, funduscopic exam normal, discs flat and sharp  Ears - bilateral TM's and external ear canals normal  Mouth - mucous membranes moist, pharynx normal without lesions  Neck - supple, no significant adenopathy, carotids upstroke normal bilaterally, no bruits, thyroid exam: thyroid is normal in size without nodules or tenderness  Chest - clear to auscultation, no wheezes, rales or rhonchi, symmetric air entry  Heart - normal rate, regular rhythm, normal S1, S2, no murmurs, rubs, clicks or gallops  Abdomen - soft, nontender, nondistended, no masses or organomegaly  Breasts - breasts appear normal, no suspicious masses, no skin or nipple changes or axillary nodes  Pelvic exam: VULVA: normal appearing vulva with no masses, tenderness or lesions, VAGINA: normal appearing vagina with normal color and discharge, no lesions, CERVIX: normal appearing cervix without discharge or lesions.  Neurological - alert, oriented, normal speech, no focal findings or movement disorder noted, DTR's normal and symmetric  Extremities - peripheral pulses normal, no pedal edema, no clubbing or cyanosis  Skin - no rashes or worrisome lesions

## 2021-06-06 NOTE — PROGRESS NOTES
Assessment and Plan    1. Encounter for Nexplanon removal  Easily done - tolerated procedure well    2. Encounter for initial prescription of contraceptive pills  Discussed risk of blood clots  - norgestimate-ethinyl estradioL (ORTHO TRI-CYCLEN LO) 0.18/0.215/0.25 mg-25 mcg tablet; Take 1 tablet by mouth daily.  Dispense: 1 Package; Refill: 1  She will call me or send a [employer] message  and confirm that this contraceptive is tolerated and that she would like to continue - I will send year's refill    Return in about 1 year (around 3/5/2021) for Annual physical.    Khushi Robles MD     -------------------------------------------    Chief Complaint   Patient presents with     Contraception     nexplanon removal     Aidee would like her Nexplanon out, and would like to switch to oral contraceptives    There are no preventive care reminders to display for this patient.  Health Maintenance reviewed -.    The history section was last reviewed by Cheli Jose CMA on Mar 5, 2020.    Social History     Tobacco Use   Smoking Status Never Smoker   Smokeless Tobacco Never Used       Social History     Substance and Sexual Activity   Alcohol Use Yes     Alcohol/week: 5.0 - 7.0 standard drinks     Types: 5 - 7 Standard drinks or equivalent per week     Frequency: 4 or more times a week     Drinks per session: 1 or 2         Vitals:    03/05/20 1431   BP: 98/68   Pulse: 72   SpO2: 99%     Body mass index is 21.63 kg/m .     EXAM:    General appearance - alert, well appearing, and in no distress  Mental status - normal mood, behavior, speech, dress, motor activity, and thought processes  Skin - palpable nexplanon tube upper inner left arm    Procedure note:  After explaining the procedure and obtaining verbal consent, I identified the Nexplanon by palpation of the area of left inner upper arm and put a alfonso at the palpated base to alfonso where the incision would go. I infiltrated the area at the base of the Nexplanon with 1%  lidocaine, the prepped the same area with betadine and covered with a sterile drape.  I made a 1.0 cm horizontal incision at the palpated base of the Nexplanon, pushed  the area of skin at the top of the Nexplanon so that it would move distally , and dissected the tube from the surrounding tissue. I then grasped the Nexplanon with forceps and easily removed it.  I closed the incision with a steri strip, as the Nexplanon was superficial and easily removed with minimal tissue dissection

## 2021-06-07 NOTE — TELEPHONE ENCOUNTER
Controlled Substance Refill Request  Medication Name:   Requested Prescriptions     Pending Prescriptions Disp Refills     dextroamphetamine-amphetamine (ADDERALL) 5 mg Tab tablet 30 tablet 0     Sig: Take 1 tablet in the afternoon as needed     dextroamphetamine-amphetamine (ADDERALL XR) 15 MG 24 hr capsule 30 capsule 0     Sig: Take 1 capsule (15 mg total) by mouth daily.     Date Last Fill: 2/28/20  Requested Pharmacy: [facility]  Submit electronically to pharmacy  Controlled Substance Agreement on file:   Encounter-Level CSA Scan Date - 12/09/2016:    Scan on 12/9/2016        Last office visit:  3/5/20

## 2021-06-07 NOTE — TELEPHONE ENCOUNTER
I approve this refill - please have doc of the day sign, as I cannot do this from home    CSA done 2/28/20 (drug screen done the same day)      Diagnosis: ADHD  Medication : 1) Adderall xr 15 mg po daily 2) Adderall 5 mg po prn daily  Quantity per month: 1) 30 2)30  Number of refills before follow up visit: 6 months

## 2021-06-07 NOTE — TELEPHONE ENCOUNTER
Medication Question or Clarification  Who is calling: Patient  What medication are you calling about (include dose and sig)?:    Disp  Refills  Start  End     dextroamphetamine-amphetamine (ADDERALL XR) 15 MG 24 hr capsule  30 capsule  0  4/6/2020 4/6/2021     Sig - Route: Take 1 capsule (15 mg total) by mouth daily. - Oral     Sent to pharmacy as: dextroamphetamine-amphetamine ER 15 mg 24hr capsule,extend release (ADDERALL XR)     Earliest Fill Date: 4/6/2020     E-Prescribing Status: Receipt confirmed by pharmacy (4/6/2020  2:37 PM CDT)       Disp  Refills  Start  End      dextroamphetamine-amphetamine (ADDERALL) 5 mg Tab tablet  30 tablet  0  4/6/2020      Sig: Take 1 tablet in the afternoon as needed     Sent to pharmacy as: dextroamphetamine-amphetamine 5 mg tablet (ADDERALL)     Earliest Fill Date: 4/6/2020     E-Prescribing Status: Receipt confirmed by pharmacy (4/6/2020  2:37 PM CDT)         Who prescribed the medication?: Khushi Robles MD   What is your question/concern?: Patient stated [facility] does not have the Adderall pills in stock. Patient would like both Adderalls sent to a different pharmacy.  Requested Pharmacy: [facility] to leave a detailed message?: Yes [phone]

## 2021-06-07 NOTE — TELEPHONE ENCOUNTER
Pt advised she would like refill RX sent to [facility] in [place] on [address] [facility].

## 2021-06-07 NOTE — TELEPHONE ENCOUNTER
Medication: dextroamphetamine - amphetamine (adderrall) 5 mg tabllet  Pharmacy: [facility]  Last OV: 03/5/20 with Dr. Robles  Last refill: 2/28/20, #30 no refills  Authorized by Dr. Robles    Medication: dextroamphetamine-amphetamine (Adderall XR) 15 mg  Last refill: 2/28/20, # 30 no refills authorized by Dr. Robles    [facility]

## 2021-06-07 NOTE — TELEPHONE ENCOUNTER
Refill Approved    Rx renewed per Medication Renewal Policy. Medication was last renewed on 3/5/20.    Sherine Thayer, [facility] Connection Triage/Med Refill 4/30/2020     Requested Prescriptions   Pending Prescriptions Disp Refills     TRI-LO-SPRINTEC 0.18/0.215/0.25 mg-25 mcg tablet [Pharmacy Med Name: TRI-LO-SPRINTEC TABLET## 0.18/0.215/ TAB] 28 tablet 1     Sig: TAKE 1 TABLET BY MOUTH DAILY.       Oral Contraceptives Protocol Passed - 4/29/2020  3:27 PM        Passed - Visit with PCP or prescribing provider visit in last 12 months      Last office visit with prescriber/PCP: 3/5/2020 Khushi Robles MD OR same dept: 3/5/2020 Khushi Robles MD OR same specialty: 3/5/2020 Khushi Robles MD  Last physical: 2/28/2020 Last MTM visit: Visit date not found   Next visit within 3 mo: Visit date not found  Next physical within 3 mo: Visit date not found  Prescriber OR PCP: Khushi Robles MD  Last diagnosis associated with med order: 1. Encounter for initial prescription of contraceptive pills  - TRI-LO-SPRINTEC 0.18/0.215/0.25 mg-25 mcg tablet [Pharmacy Med Name: TRI-LO-SPRINTEC TABLET## 0.18/0.215/ TAB]; TAKE 1 TABLET BY MOUTH DAILY.  Dispense: 28 tablet; Refill: 1    If protocol passes may refill for 12 months if within 3 months of last provider visit (or a total of 15 months).

## 2021-06-09 NOTE — TELEPHONE ENCOUNTER
Controlled Substance Refill Request  Medication Name:   Requested Prescriptions     Pending Prescriptions Disp Refills     dextroamphetamine-amphetamine (ADDERALL XR) 15 MG 24 hr capsule 30 capsule 0     Sig: Take 1 capsule (15 mg total) by mouth daily.     dextroamphetamine-amphetamine (ADDERALL) 5 mg Tab tablet 30 tablet 0     Sig: Take 1 tablet in the afternoon as needed     Date Last Fill: 4/21/20  Requested Pharmacy: [facility]  Submit electronically to pharmacy  Controlled Substance Agreement on file:   Encounter-Level CSA Scan Date - 12/09/2016:    Scan on 12/9/2016        Last office visit:  3/5/20

## 2021-06-11 ENCOUNTER — COMMUNICATION - HEALTHEAST (OUTPATIENT)
Dept: SCHEDULING | Facility: CLINIC | Age: 26
End: 2021-06-11

## 2021-06-11 DIAGNOSIS — Z23 HIGH PRIORITY FOR COVID-19 VIRUS VACCINATION: ICD-10-CM

## 2021-06-11 NOTE — PROGRESS NOTES
"Assessment and Plan    1. ADD (attention deficit disorder) without hyperactivity  Previous regimen of 7.5  ER bid felt  Like too much of a crash when the medication wore off.  Will try regimen with XR in the am and immediate release in the pm, still a total of 15 mg daily   - dextroamphetamine-amphetamine (ADDERALL XR) 10 MG 24 hr capsule; Take 1 capsule (10 mg total) by mouth daily.  Dispense: 30 capsule; Refill: 0  - dextroamphetamine-amphetamine (ADDERALL) 5 mg Tab tablet; Take in the afternoon as needed  Dispense: 30 tablet; Refill: 0    If in stable pattern of use - will refill for a year    Khushi Robles MD     -------------------------------------------    Chief Complaint   Patient presents with     ADHD     discuss abouit other options, might want to change it.     Aidee reviews her history of pharmaceutical treatment for ADHD   - Vyvanse - having trouble sleeping   - Switched to Adderall IR 7.5 BID - it works really well - better than vyvanse - but she crashes harder when it wears off.  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 , Disp: , Rfl:      etonogestrel (NEXPLANON) 68 mg Impl implant, 1 each by Subdermal route once., Disp: , Rfl:      ibuprofen (ADVIL,MOTRIN) 200 MG tablet, Take 200-600 mg by mouth every 6 (six) hours as needed for pain (back pain)., Disp: , Rfl:      dextroamphetamine-amphetamine (ADDERALL XR) 10 MG 24 hr capsule, Take 1 capsule (10 mg total) by mouth daily., " Disp: 30 capsule, Rfl: 0     dextroamphetamine-amphetamine (ADDERALL) 5 mg Tab tablet, Take in the afternoon as needed, Disp: 30 tablet, Rfl: 0        History   Smoking Status     Never Smoker   Smokeless Tobacco     Never Used       History   Alcohol Use     3.0 - 4.2 oz/week     5 - 7 Standard drinks or equivalent per week       Review of Systems - feeling well overall    Vitals:    06/29/17 1115   BP: 102/62   Pulse: 80   SpO2: 97%     Body mass index is 22.44 kg/(m^2).     EXAM:    General appearance - alert, well appearing, and in no distress

## 2021-06-11 NOTE — TELEPHONE ENCOUNTER
Controlled Substance Refill Request  Medication Name:   Requested Prescriptions     Pending Prescriptions Disp Refills     dextroamphetamine-amphetamine (ADDERALL XR) 15 MG 24 hr capsule 30 capsule 0     Sig: Take 1 capsule (15 mg total) by mouth daily.     dextroamphetamine-amphetamine (ADDERALL) 5 mg Tab tablet 30 tablet 0     Sig: Take 1 tablet in the afternoon as needed     Date Last Fill: 6/26/20  Requested Pharmacy: [facility]  Submit electronically to pharmacy  Controlled Substance Agreement on file:   Encounter-Level CSA Scan Date - 12/09/2016:    Scan on 12/9/2016        Last office visit:  3/5/20

## 2021-06-12 NOTE — TELEPHONE ENCOUNTER
Controlled Substance Refill Request  Medication Name:   Requested Prescriptions     Pending Prescriptions Disp Refills     dextroamphetamine-amphetamine (ADDERALL XR) 15 MG 24 hr capsule 30 capsule 0     Sig: Take 1 capsule (15 mg total) by mouth daily.     dextroamphetamine-amphetamine (ADDERALL) 5 mg Tab tablet 30 tablet 0     Sig: Take 1 tablet in the afternoon as needed     Date Last Fill: 9/9/20  Requested Pharmacy: [facility]  Submit electronically to pharmacy  Controlled Substance Agreement on file:   Encounter-Level CSA Scan Date - 12/09/2016:    Scan on 12/9/2016        Last office visit:  3/5/20

## 2021-06-13 NOTE — TELEPHONE ENCOUNTER
Controlled Substance Refill Request  Medication Name:   Requested Prescriptions     Pending Prescriptions Disp Refills     dextroamphetamine-amphetamine (ADDERALL XR) 15 MG 24 hr capsule 30 capsule 0     Sig: Take 1 capsule (15 mg total) by mouth daily.     dextroamphetamine-amphetamine (ADDERALL) 5 mg Tab tablet 30 tablet 0     Sig: Take 1 tablet in the afternoon as needed     Date Last Fill: 10/12/20  Requested Pharmacy: [facility]  Submit electronically to pharmacy  Controlled Substance Agreement on file:   Encounter-Level CSA Scan Date - 12/09/2016:    Scan on 12/9/2016        Last office visit:  3/5/20

## 2021-06-13 NOTE — TELEPHONE ENCOUNTER
Controlled Substance Refill Request  Medication Name:   Requested Prescriptions     Pending Prescriptions Disp Refills     dextroamphetamine-amphetamine (ADDERALL XR) 15 MG 24 hr capsule 30 capsule 0     Sig: Take 1 capsule (15 mg total) by mouth daily.     dextroamphetamine-amphetamine (ADDERALL) 5 mg Tab tablet 30 tablet 0     Sig: Take 1 tablet in the afternoon as needed     Date Last Fill: 11/13/20  Requested Pharmacy: [facility]  Submit electronically to pharmacy  Controlled Substance Agreement on file:   Encounter-Level CSA Scan Date - 12/09/2016:    Scan on 12/9/2016        Last office visit:  3/5/20

## 2021-06-14 NOTE — TELEPHONE ENCOUNTER
Diagnosis: ADHD  Medication : 1) Adderall xr 15 mg po daily 2) Adderall 5 mg po prn daily  Quantity per month: 1) 30 2)30  Number of refills before follow up visit: 6 months

## 2021-06-14 NOTE — TELEPHONE ENCOUNTER
Spoke with pt and relayed message. Scheduled pt for a physical and med check on 2/16/21 at 3:20pm.

## 2021-06-14 NOTE — TELEPHONE ENCOUNTER
Controlled Substance Refill Request  Medication Name:   Requested Prescriptions     Pending Prescriptions Disp Refills     dextroamphetamine-amphetamine (ADDERALL XR) 15 MG 24 hr capsule 30 capsule 0     Sig: Take 1 capsule (15 mg total) by mouth daily.     dextroamphetamine-amphetamine (ADDERALL) 5 mg Tab tablet 30 tablet 0     Sig: Take 1 tablet in the afternoon as needed     Date Last Fill: 12/23/20  Requested Pharmacy: [facility]  Submit electronically to pharmacy  Controlled Substance Agreement on file:   Encounter-Level CSA Scan Date - 12/09/2016:    Scan on 12/9/2016        Last office visit:  3/5/20  Letha Guadarrama RN, MA  [facility]    Triage Nurse Advisor

## 2021-06-15 NOTE — PROGRESS NOTES
FEMALE PREVENTATIVE EXAM    Assessment and Plan:     Patient has been advised of split billing requirements and indicates understanding: Yes   controlled chronic issues with no change in management or complex decision making today    1. Routine general medical examination at a health care facility      2. ADD (attention deficit disorder) without hyperactivity  Well controlled on:   - dextroamphetamine-amphetamine (ADDERALL) 5 mg Tab tablet; Take 1 tablet in the afternoon as needed  Dispense: 30 tablet; Refill: 0  - dextroamphetamine-amphetamine (ADDERALL XR) 15 MG 24 hr capsule; Take 1 capsule (15 mg total) by mouth daily.  Dispense: 30 capsule; Refill: 0    3. Screen for STD (sexually transmitted disease)  - Chlamydia trachomatis & Neisseria gonorrhoeae, Amplified Detection    4. Controlled substance agreement signed  - Drug Abuse 1+, Urine        Next follow up:  Return in about 6 months (around 9/9/2021) for follow up, [facility]    Immunization Review  Adult Imm Review: No immunizations due today    I discussed the following with the patient:   Adult Healthy Living: Getting adequate sleep    Khushi Robles MD        Subjective:   Chief Complaint: Aidee Cortes is an 25 y.o. female here for a preventative health visit.    Patient has been advised of split billing requirements and indicates understanding: Yes  HPI:     Aidee started this fall at [employer] - it is a little weird on line. 30 class zoom meeting. She is not planning on taking classes in the summer.  She has applied to some clerkships, but if she cannot find a suitable one, will go back to working as a .  She is interested in Labor Law and working for [email or web address]    ADHD - continues to use Adderall 15 mg XR in am and 5 mg immediate release in pm - helps with school, work.  No changed in appetite.  It has been harder for her to fall asleep recently, she suspects due to less frequent exercise.  She had good sleep  hygiene:  turns screen off an hour before bed, does reading - hasn't tried aps    Reproductive Health - no current partner , no concerns for STI.  She had weird side effects from pill in past  - wu. She had tried nuvaring once. She most recently used Nexplanon, but that made her bleed too frequently    Healthy Habits  Are you taking a daily aspirin? No  Do you typically exercising at least 40 min, 3-4 times per week?  NO - will be going to walks with Mom  Do you usually eat at least 4 servings of fruit and vegetables a day, include whole grains and fiber and avoid regularly eating high fat foods? Yes  Have you had an eye exam in the past two years? NO  Do you see a dentist twice per year? NO  Do you have any concerns regarding sleep? YES - assume from less exercise    Safety Screen  If you own firearms, are they secured in a locked gun cabinet or with trigger locks? The patient does not own any firearms  Do you feel you are safe where you are living?: Yes (3/9/2021 12:09 PM)  Do you feel you are safe in your relationship(s)?: Yes (3/9/2021 12:09 PM)      Review of Systems:    Constitutional: negative for recent illness or change in weight  Eyes: negative for irritation and vision change  Ears, nose, mouth, throat, and face: negative for nasal congestion and sore throat  Respiratory: negative for cough and dyspnea on exertion  Cardiovascular: negative for chest pain and palpitations  Gastrointestinal: negative for abdominal pain and change in bowel habits  Genitourinary:negative for dysuria, frequency and hesitancy  Integument/breast: negative for rash  Hematologic/lymphatic: negative for bleeding and easy bruising  Musculoskeletal:negative for arthralgias and myalgias  Neurological: negative for dizziness, headaches and paresthesia      Cancer Screening       Status Date      PAP SMEAR Next Due 2/28/2023      Done 2/28/2020 GYNECOLOGIC CYTOLOGY (PAP SMEAR)     Patient has more history with this topic...               History     Reviewed By Date/Time Sections Reviewed    Baldev [place] CMA 3/9/2021 12:11 PM Tobacco            Objective:   Vital Signs:   Visit Vitals  /62 (Patient Site: Left Arm, Patient Position: Sitting, Cuff Size: Adult Regular)   Pulse (!) 118   Temp 98.1  F (36.7  C) (Oral)   LMP 02/13/2021   SpO2 98%      Pulse on exam was normal    PHYSICAL EXAM  General appearance - alert, well appearing, and in no distress  Mental status - normal mood, behavior, speech, dress, motor activity, and thought processes  Eyes - pupils equal and reactive, extraocular eye movements intact, funduscopic exam normal, discs flat and sharp  Ears - bilateral TM's and external ear canals normal  Mouth - mucous membranes moist, pharynx normal without lesions  Neck - supple, no significant adenopathy, carotids upstroke normal bilaterally, no bruits, thyroid exam: thyroid is normal in size without nodules or tenderness  Chest - clear to auscultation, no wheezes, rales or rhonchi, symmetric air entry  Heart - normal rate, regular rhythm, normal S1, S2, no murmurs, rubs, clicks or gallops  Abdomen - soft, nontender, nondistended, no masses or organomegaly  Breasts - breasts appear normal, no suspicious masses, no skin or nipple changes or axillary nodes  Neurological - alert, oriented, normal speech, no focal findings or movement disorder noted, DTR's normal and symmetric  Extremities - peripheral pulses normal, no pedal edema, no clubbing or cyanosis  Skin - no rashes or worrisome lesions

## 2021-06-16 PROBLEM — Z79.899 CONTROLLED SUBSTANCE AGREEMENT SIGNED: Status: ACTIVE | Noted: 2019-11-14

## 2021-06-16 NOTE — TELEPHONE ENCOUNTER
Controlled Substance Refill Request  Medication Name:   Requested Prescriptions     Pending Prescriptions Disp Refills     dextroamphetamine-amphetamine (ADDERALL) 5 mg Tab tablet 30 tablet 0     Sig: Take 1 tablet in the afternoon as needed     dextroamphetamine-amphetamine (ADDERALL XR) 15 MG 24 hr capsule 30 capsule 0     Sig: Take 1 capsule (15 mg total) by mouth daily.     Date Last Fill: 3/9/21  Requested Pharmacy: [facility]  Submit electronically to pharmacy  Controlled Substance Agreement on file:   Encounter-Level CSA Scan Date - 12/09/2016:    Scan on 12/9/2016        Last office visit:  3/9/21

## 2021-06-16 NOTE — PROGRESS NOTES
Assessment and Plan    1. Unusually frequent menses  - Thyroid Stimulating Hormone (TSH)   - consider discontinuing Nexplanon and starting Nuvaring if TSH is normal    2. Routine screening for STI (sexually transmitted infection)  - Chlamydia trachomatis & Neisseria gonorrhoeae, Amplified Detection    3. ADD (attention deficit disorder) without hyperactivity  Current dose of 10 mg is no longer affective as previously.  Will try increasing her morning dose and cutting back on prn afternoon dose.  She will let e know by MyChart how the new dose is  - dextroamphetamine-amphetamine (ADDERALL XR) 15 MG 24 hr capsule; Take 1 capsule (15 mg total) by mouth daily.  Dispense: 30 capsule; Refill: 0    Return if symptoms worsen or fail to improve.         Khushi Robles MD     -------------------------------------------    Chief Complaint   Patient presents with     Menstrual Problem     irregular bleeding- x9 months, nexplanon 2 years ago     Aidee is using Nexplanon for contraception.  She was fin for the first year - then bleeding started 6-9 months ago -she bleeds for a week, then off for a few days - several episode over one month.    A few years ago she was on the pill: she would forget to take it sometimes so she wanted something she would not have to think about as much, and several of her friends liked the Nexplanon.  For a  short time she was on Nuvaring: doesn't remember having it for long    ROS:  -No other bleeding  -No easy bruising  -Mom says family history of PCOS  -No weight gain or loss  -No palpitations or light headedness     Social: she is in college, majoring in [employer]; works as a  at  [employer]    Other concerns:    ADHD  Normally she takes 10 mg extended release Adderall in the am, and the immediate release  5mg Adderall other as needed. Started a year ago and it worked well, now not working as well.  She does not always want to take the immediate release in the afternoon. From time to time she  has a hard time sleeping - schedule is weird - doesn't go to sleep at the same.  She would like to try increasing the dose of her morning Adderall and taking the afternoon dose less frequently    Patient Active Problem List   Diagnosis     Tension-type Headache     ADD (attention deficit disorder) without hyperactivity     Dysfunctional uterine bleeding     Nexplanon insertion (7/26/16)       Current Outpatient Prescriptions on File Prior to Visit   Medication Sig Dispense Refill     CYANOCOBALAMIN, VITAMIN B-12, (VITAMIN B-12 ORAL) Take 1 tablet by mouth as needed.        dextroamphetamine-amphetamine (ADDERALL) 5 mg Tab tablet Take 1 tablet in the afternoon as needed 30 tablet 0     etonogestrel (NEXPLANON) 68 mg Impl implant 1 each by Subdermal route once.       ibuprofen (ADVIL,MOTRIN) 200 MG tablet Take 200-600 mg by mouth every 6 (six) hours as needed for pain (back pain).       No current facility-administered medications on file prior to visit.            Health Maintenance Due   Topic Date Due     CHLAMYDIA SCREENING  07/31/2017     Health Maintenance reviewed - done today.    History   Smoking Status     Never Smoker   Smokeless Tobacco     Never Used       History   Alcohol Use     3.0 - 4.2 oz/week     5 - 7 Standard drinks or equivalent per week       Vitals:    03/02/18 1353   BP: 90/68   Pulse: 96     Body mass index is 21.14 kg/(m^2).     EXAM:    General appearance - alert, well appearing, and in no distress  Mental status - normal mood, behavior, speech, dress, motor activity, and thought processes  Neurological - alert, oriented, normal speech, no focal findings or movement disorder noted

## 2021-06-17 NOTE — TELEPHONE ENCOUNTER
Telephone Encounter by Tati Camejo LPN at 4/20/2021  8:46 AM     Author: Tati Camejo LPN Service: -- Author Type: Licensed Nurse    Filed: 4/20/2021  8:50 AM Encounter Date: 4/19/2021 Status: Signed    : Tati Camejo LPN (Licensed Nurse)       Medication: Adderall XR 15 mg  Last Date Filled 3/12/21    Medication: Dextroamphetamine-amphetamine 5 mg   Last Date Filled 3/12/21     pulled: YES        Only PCP Prescribing? : YES  Taken as prescribed from physician notes? YES    CSA in last year: YES  Random Utox in last year: YES    Opioids + benzodiazepines? NO    Is patient on the Executive Review Team? NO      All responses suggest: Refilling prescription

## 2021-06-17 NOTE — TELEPHONE ENCOUNTER
Controlled Substance Refill Request  Medication Name:   Requested Prescriptions     Pending Prescriptions Disp Refills     dextroamphetamine-amphetamine (ADDERALL) 5 mg Tab tablet 30 tablet 0     Sig: Take 1 tablet in the afternoon as needed     dextroamphetamine-amphetamine (ADDERALL XR) 15 MG 24 hr capsule 30 capsule 0     Sig: Take 1 capsule (15 mg total) by mouth daily.     Date Last Fill: 4/20/21  Requested Pharmacy: [facility]  Submit electronically to pharmacy  Controlled Substance Agreement on file:   Encounter-Level CSA Scan Date - 12/09/2016:    Scan on 12/9/2016        Last office visit:  3/9/21

## 2021-06-18 NOTE — LETTER
Letter by Khushi Robles MD at      Author: Khushi Robles MD Service: -- Author Type: --    Filed:  Encounter Date: 2/12/2019 Status: (Other)         [facility] FAMILY MEDICINE/OB  02/12/19    Patient: Aidee Cortes  YOB: 1995  Medical Record Number: [id number]  CSN: [id number]                                                                              Non-opioid Controlled Substance Agreement    Diagnosis ADHD  Medication : Adderall XR 15 mg, adderall 5 mg  Quantity per month: 30 each  No of refills before follow up visit: year    I understand that my care provider has prescribed a controlled substance to help manage my condition(s). I am taking this medicine to help me function or work. I know this is strong medicine, and that it can cause serious side effects. Controlled substances can be sedating, addicting and may cause a dependency on the drug. They can affect my ability to drive or think, and cause depression. They need to be taken exactly as prescribed. Combining controlled substances with certain medicines or chemicals (such as cocaine, sedatives and tranquilizers, sleeping pills, meth) can be dangerous or even fatal. Also, if I stop controlled substances suddenly, I may have severe withdrawal symptoms.  If not helpful, I may be asked to stop them.    The risks, benefits, and side effects of these medicine(s) were explained to me. I agree that:    1. I will take part in other treatments as advised by my care team. This may be psychiatry or counseling, physical therapy, behavioral therapy, group treatment or a referral to a pain clinic. I will reduce or stop my medicine when my care team tells me to do so.  2. I will take my medicines as prescribed. I will not change the dose or schedule unless my care team tells me to. There will be no refills if I run out early.  I may be contactedwithout warning and asked to complete a urine drug test or pill count at any time.   3. I will  keep all my appointments, and understand this is part of the monitoring of controlled substances. My care team may require an office visit for EVERY controlled substance refill. If I miss appointments or dont follow instructions, my care team may stop my medicine.  4. I will not ask other providers to prescribe controlled substances, and I will not accept controlled substances from other people. If I need another prescribed controlled substance for a new reason, I will tell my care team within 1 business day.  5. I will use one pharmacy to fill all of my controlled substance prescriptions, and it is up to me to make sure that I do not run out of my medicines on weekends or holidays. If my care team is willing to refill my controlled substance prescription without a visit, I must request refills only during office hours, refills may take up to 3 days to process, and it may take up to 5 to 7 days for my medicine to be mailed and ready at my pharmacy. Prescriptions will not be mailed anywhere except my pharmacy.    6. I am responsible for my prescriptions. If the medicine/prescription is lost or stolen, it will not be replaced. I also agree not to share controlled substance medicines with anyone.  [facility] FAMILY MEDICINE/OB  02/12/19  Patient:  Aidee Cortes  YOB: 1995  Medical Record Number: [id number]  [facility]: [id number]    7. I agree to not use ANY illegal or recreational drugs. This includes marijuana, cocaine, bath salts or other drugs. I agree not to use alcohol unless my care team says I may. I agree to give urine samples whenever asked. If I dont give a urine sample, the care team may stop my medicine.    8. If I enroll in the [place] Medical Marijuana program, I will tell my care team. I will also sign an agreement to share my medical records with my care team.    9. I will bring in my list of medicines (or my medicine bottles) each time I come to the clinic.   10. I will tell my  care team right away if I become pregnant or have a new medical problem treated outside of my regular clinic.  11. I understand that this medicine can affect my thinking and judgment. It may be unsafe for me to drive, use machinery and do dangerous tasks. I will not do any of these things until I know how the medicine affects me. If my dose changes, I will wait to see how it affects me. I will contact my care team if I have concerns about medicine side effects.    I understand that if I do not follow any of the conditions above, my prescriptions or treatment may be stopped.      I agree that my provider, clinic care team, and pharmacy may work with any city, state or federal law enforcement agency that investigates the misuse, sale, or other diversion of my controlled medicine. I will allow my provider to discuss my care with or share a copy of this agreement with any other treating provider, pharmacy or emergency room where I receive care. I agree to give up (waive) any right of privacy or confidentiality with respect to these consents.   I have read this agreement and have asked questions about anything I did not understand.    ___________________________________________________________________________  Patient signature - Date/Time  -Aidee Cortes                                      ___________________________________________________________________________  Witness signature                                                                    ___________________________________________________________________________  Provider signature- Khushi Robles MD

## 2021-06-20 NOTE — LETTER
Letter by Khushi Robles MD at      Author: Khushi Robles MD Service: -- Author Type: --    Filed:  Encounter Date: 2/28/2020 Status: (Other)         [facility] FAMILY MEDICINE/OB  02/28/20    Patient: Aidee Cortes  YOB: 1995  Medical Record Number: [id number]  CSN: [id number]                                                                              Non-opioid Controlled Substance Agreement    Diagnosis: ADHD  Medication : 1) Adderall xr 15 mg po daily 2) Adderall 5 mg po prn daily  Quantity per month: 1) 30 2)30  Number of refills before follow up visit: 6 months      I understand that my care provider has prescribed a controlled substance to help manage my condition(s). I am taking this medicine to help me function or work. I know this is strong medicine, and that it can cause serious side effects. Controlled substances can be sedating, addicting and may cause a dependency on the drug. They can affect my ability to drive or think, and cause depression. They need to be taken exactly as prescribed. Combining controlled substances with certain medicines or chemicals (such as cocaine, sedatives and tranquilizers, sleeping pills, meth) can be dangerous or even fatal. Also, if I stop controlled substances suddenly, I may have severe withdrawal symptoms.  If not helpful, I may be asked to stop them.    The risks, benefits, and side effects of these medicine(s) were explained to me. I agree that:    1. I will take part in other treatments as advised by my care team. This may be psychiatry or counseling, physical therapy, behavioral therapy, group treatment or a referral to a pain clinic. I will reduce or stop my medicine when my care team tells me to do so.  2. I will take my medicines as prescribed. I will not change the dose or schedule unless my care team tells me to. There will be no refills if I run out early.  I may be contactedwithout warning and asked to complete a urine drug test  or pill count at any time.   3. I will keep all my appointments, and understand this is part of the monitoring of controlled substances. My care team may require an office visit for EVERY controlled substance refill. If I miss appointments or dont follow instructions, my care team may stop my medicine.  4. I will not ask other providers to prescribe controlled substances, and I will not accept controlled substances from other people. If I need another prescribed controlled substance for a new reason, I will tell my care team within 1 business day.  5. I will use one pharmacy to fill all of my controlled substance prescriptions, and it is up to me to make sure that I do not run out of my medicines on weekends or holidays. If my care team is willing to refill my controlled substance prescription without a visit, I must request refills only during office hours, refills may take up to 3 days to process, and it may take up to 5 to 7 days for my medicine to be mailed and ready at my pharmacy. Prescriptions will not be mailed anywhere except my pharmacy.    6. I am responsible for my prescriptions. If the medicine/prescription is lost or stolen, it will not be replaced. I also agree not to share controlled substance medicines with anyone.          [facility] FAMILY MEDICINE/OB  02/28/20  Patient:  Aidee Cortes  YOB: 1995  Medical Record Number: [id number]  [facility]: [id number]    7. I agree to not use ANY illegal or recreational drugs. This includes marijuana, cocaine, bath salts or other drugs. I agree not to use alcohol unless my care team says I may. I agree to give urine samples whenever asked. If I dont give a urine sample, the care team may stop my medicine.    8. If I enroll in the [place] Medical Marijuana program, I will tell my care team. I will also sign an agreement to share my medical records with my care team.    9. I will bring in my list of medicines (or my medicine bottles) each  time I come to the clinic.   10. I will tell my care team right away if I become pregnant or have a new medical problem treated outside of my regular clinic.  11. I understand that this medicine can affect my thinking and judgment. It may be unsafe for me to drive, use machinery and do dangerous tasks. I will not do any of these things until I know how the medicine affects me. If my dose changes, I will wait to see how it affects me. I will contact my care team if I have concerns about medicine side effects.    I understand that if I do not follow any of the conditions above, my prescriptions or treatment may be stopped.      I agree that my provider, clinic care team, and pharmacy may work with any city, state or federal law enforcement agency that investigates the misuse, sale, or other diversion of my controlled medicine. I will allow my provider to discuss my care with or share a copy of this agreement with any other treating provider, pharmacy or emergency room where I receive care. I agree to give up (waive) any right of privacy or confidentiality with respect to these consents.   I have read this agreement and have asked questions about anything I did not understand.    ___________________________________________________________________________  Patient signature - Date/Time  -Aidee Cortes                                      ___________________________________________________________________________  Witness signature                                                                    ___________________________________________________________________________  Provider signature- Khushi Robles MD

## 2021-06-21 ENCOUNTER — AMBULATORY - HEALTHEAST (OUTPATIENT)
Dept: NURSING | Facility: CLINIC | Age: 26
End: 2021-06-21

## 2021-06-21 DIAGNOSIS — Z23 HIGH PRIORITY FOR COVID-19 VIRUS VACCINATION: ICD-10-CM

## 2021-06-21 NOTE — LETTER
Letter by Khushi Robles MD at      Author: Khushi Robles MD Service: -- Author Type: --    Filed:  Encounter Date: 3/9/2021 Status: (Other)         [facility]  03/09/21    Patient: Aidee Cortes  YOB: 1995  Medical Record Number: [id number]  CSN: [id number]                                                                              Non-opioid Controlled Substance Agreement  Diagnosis: ADHD  Medication : 1) Adderall xr 15 mg po daily 2) Adderall 5 mg po prn daily  Quantity per month: 1) 30 2)30  Number of refills before follow up visit: 6 months    I understand that my care provider has prescribed a controlled substance to help manage my condition(s). I am taking this medicine to help me function or work. I know this is strong medicine, and that it can cause serious side effects. Controlled substances can be sedating, addicting and may cause a dependency on the drug. They can affect my ability to drive or think, and cause depression. They need to be taken exactly as prescribed. Combining controlled substances with certain medicines or chemicals (such as cocaine, sedatives and tranquilizers, sleeping pills, meth) can be dangerous or even fatal. Also, if I stop controlled substances suddenly, I may have severe withdrawal symptoms.  If not helpful, I may be asked to stop them.    The risks, benefits, and side effects of these medicine(s) were explained to me. I agree that:    1. I will take part in other treatments as advised by my care team. This may be psychiatry or counseling, physical therapy, behavioral therapy, group treatment or a referral to a pain clinic. I will reduce or stop my medicine when my care team tells me to do so.  2. I will take my medicines as prescribed. I will not change the dose or schedule unless my care team tells me to. There will be no refills if I run out early.  I may be contactedwithout warning and asked to complete a urine drug test or  pill count at any time.   3. I will keep all my appointments, and understand this is part of the monitoring of controlled substances. My care team may require an office visit for EVERY controlled substance refill. If I miss appointments or dont follow instructions, my care team may stop my medicine.  4. I will not ask other providers to prescribe controlled substances, and I will not accept controlled substances from other people. If I need another prescribed controlled substance for a new reason, I will tell my care team within 1 business day.  5. I will use one pharmacy to fill all of my controlled substance prescriptions, and it is up to me to make sure that I do not run out of my medicines on weekends or holidays. If my care team is willing to refill my controlled substance prescription without a visit, I must request refills only during office hours, refills may take up to 3 days to process, and it may take up to 5 to 7 days for my medicine to be mailed and ready at my pharmacy. Prescriptions will not be mailed anywhere except my pharmacy.    6. I am responsible for my prescriptions. If the medicine/prescription is lost or stolen, it will not be replaced. I also agree not to share controlled substance medicines with anyone.          [facility]  03/09/21  Patient:  Aidee Cortes  YOB: 1995  Medical Record Number: [id number]  [facility]: [id number]    7. I agree to not use ANY illegal or recreational drugs. This includes marijuana, cocaine, bath salts or other drugs. I agree not to use alcohol unless my care team says I may. I agree to give urine samples whenever asked. If I dont give a urine sample, the care team may stop my medicine.    8. If I enroll in the [place] Medical Marijuana program, I will tell my care team. I will also sign an agreement to share my medical records with my care team.    9. I will bring in my list of medicines (or my medicine bottles) each  time I come to the clinic.   10. I will tell my care team right away if I become pregnant or have a new medical problem treated outside of my regular clinic.  11. I understand that this medicine can affect my thinking and judgment. It may be unsafe for me to drive, use machinery and do dangerous tasks. I will not do any of these things until I know how the medicine affects me. If my dose changes, I will wait to see how it affects me. I will contact my care team if I have concerns about medicine side effects.    I understand that if I do not follow any of the conditions above, my prescriptions or treatment may be stopped.      I agree that my provider, clinic care team, and pharmacy may work with any city, state or federal law enforcement agency that investigates the misuse, sale, or other diversion of my controlled medicine. I will allow my provider to discuss my care with or share a copy of this agreement with any other treating provider, pharmacy or emergency room where I receive care. I agree to give up (waive) any right of privacy or confidentiality with respect to these consents.   I have read this agreement and have asked questions about anything I did not understand.    ___________________________________________________________________________  Patient signature - Date/Time  -Aidee Cortes                                      ___________________________________________________________________________  Witness signature                                                                    ___________________________________________________________________________  Provider signature- Khushi Robles MD

## 2021-06-22 ENCOUNTER — COMMUNICATION - HEALTHEAST (OUTPATIENT)
Dept: FAMILY MEDICINE | Facility: CLINIC | Age: 26
End: 2021-06-22

## 2021-06-22 DIAGNOSIS — F98.8 ADD (ATTENTION DEFICIT DISORDER) WITHOUT HYPERACTIVITY: ICD-10-CM

## 2021-06-23 RX ORDER — DEXTROAMPHETAMINE SACCHARATE, AMPHETAMINE ASPARTATE, DEXTROAMPHETAMINE SULFATE AND AMPHETAMINE SULFATE 1.25; 1.25; 1.25; 1.25 MG/1; MG/1; MG/1; MG/1
TABLET ORAL
Qty: 30 TABLET | Refills: 0 | Status: SHIPPED | OUTPATIENT
Start: 2021-06-24 | End: 2021-08-02

## 2021-06-23 RX ORDER — DEXTROAMPHETAMINE SACCHARATE, AMPHETAMINE ASPARTATE MONOHYDRATE, DEXTROAMPHETAMINE SULFATE AND AMPHETAMINE SULFATE 3.75; 3.75; 3.75; 3.75 MG/1; MG/1; MG/1; MG/1
15 CAPSULE, EXTENDED RELEASE ORAL DAILY
Qty: 30 CAPSULE | Refills: 0 | Status: SHIPPED | OUTPATIENT
Start: 2021-06-24 | End: 2021-08-02

## 2021-06-23 NOTE — TELEPHONE ENCOUNTER
On request for refills both 11/20 ad 12/10/18 it was documented on the phone call  that she could have no further refills without office visit.  This was also added to her prescriptions.  I will consider a refill of a month or less once honey has an appointment     Disp Refills Start End JENNIFER   dextroamphetamine-amphetamine (ADDERALL XR) 15 MG 24 hr capsule (Discontinued) 30 capsule 0 11/23/2018 12/20/2018 No   Sig - Route: Take 1 capsule (15 mg total) by mouth daily. - Oral   Sent to pharmacy as: dextroamphetamine-amphetamine (ADDERALL XR) 15 MG 24 hr capsule   Earliest Fill Date: 11/23/2018   Notes to Pharmacy: No further refills without office visit   Reason for Discontinue: Reorder   E-Prescribing Status: Receipt confirmed by pharmacy (11/23/2018 12:36 PM CST)          Disp Refills Start End    dextroamphetamine-amphetamine (ADDERALL XR) 15 MG 24 hr capsule 30 capsule 0 12/24/2018 12/24/2019    Sig - Route: Take 1 capsule (15 mg total) by mouth daily. - Oral    Sent to pharmacy as: dextroamphetamine-amphetamine (ADDERALL XR) 15 MG 24 hr capsule    Earliest Fill Date: 12/24/2018    Notes to Pharmacy: No further refills without office visit    E-Prescribing Status: Receipt confirmed by pharmacy (12/

## 2021-06-23 NOTE — TELEPHONE ENCOUNTER
Left message to call back for: Pt  Information to relay to patient:  On request for refills both 11/20 ad 12/10/18 it was documented on the phone call  that she could have no further refills without office visit.  This was also added to her prescriptions.  I will consider a refill of a month or less once [facility] has an appointment.  When pt calls back, please assist pt in making an appt.  Thank you.

## 2021-06-23 NOTE — TELEPHONE ENCOUNTER
Controlled Substance Refill Request  Medication:   Requested Prescriptions     Pending Prescriptions Disp Refills     dextroamphetamine-amphetamine (ADDERALL) 5 mg Tab tablet 30 tablet 0     Sig: Take 1 tablet in the afternoon as needed     dextroamphetamine-amphetamine (ADDERALL XR) 15 MG 24 hr capsule 30 capsule 0     Sig: Take 1 capsule (15 mg total) by mouth daily.     Date Last Fill: 12/24/18  Pharmacy: [facility]   Submit electronically to pharmacy  Controlled Substance Agreement on File:   Encounter-Level CSA Scan Date - 12/09/2016:    Scan on 12/9/2016 (below)         Last office visit: Last office visit pertaining to requested medication was 3/2/18.

## 2021-06-24 NOTE — PROGRESS NOTES
Assessment and Plan    1. ADD (attention deficit disorder) without hyperactivity  Well controlled on:   - dextroamphetamine-amphetamine (ADDERALL XR) 15 MG 24 hr capsule; Take 1 capsule (15 mg total) by mouth daily.  Dispense: 30 capsule; Refill: 0  - dextroamphetamine-amphetamine (ADDERALL) 5 mg Tab tablet; Take 1 tablet in the afternoon as needed  Dispense: 30 tablet; Refill: 0    2. Controlled substance agreement signed  For medications above  - Pain Clinic Survey, Urine; Standing      Return in about 6 months (around 8/12/2019) for Annual physical.    Khushi Robles MD     -------------------------------------------    Chief Complaint   Patient presents with     Follow-up     f/u med check      I asked Aidee to come in for a follow up visit - last time I saw her was in 11/2017    ADHD - has not been taking in last week, but has been taking in the last month.  To review - she was diagnosed age 16 - was on VyVanse for a long time - started adderall about 2 years ago/  Vyvanse kept her up at night and ruined appetite .  Adderall is better -  As long as she takes in on schedule ,no problems. Always takes 15, usually takes 5 mg - not always day off. She is not using cannabis    Wt Readings from Last 3 Encounters:   02/12/19 132 lb 8 oz (60.1 kg)   03/02/18 130 lb (59 kg)   06/29/17 138 lb (62.6 kg)     Contraception - Nexplanon due to be changed this summer.  Does make menses irregular  discussed vaginal ring     She is pretty healthy otherwise  Family, social, surgical and medical history reviewed    Social: she works as a         Health Maintenance Due   Topic Date Due     INFLUENZA VACCINE RULE BASED (1) 08/01/2018     CHLAMYDIA SCREENING  03/02/2019     PAP SMEAR  08/12/2019     Health Maintenance reviewed - reminded due for pap smear - urged her to make follow up visit 6 months    Social History     Tobacco Use   Smoking Status Never Smoker   Smokeless Tobacco Never Used       Social History     Substance  and Sexual Activity   Alcohol Use Yes     Alcohol/week: 3.0 - 4.2 oz     Types: 5 - 7 Standard drinks or equivalent per week     Frequency: 4 or more times a week     Drinks per session: 1 or 2         Vitals:    02/12/19 1515   BP: 90/60   Pulse: 69   Resp: 16   SpO2: 98%     Body mass index is 21.55 kg/m .     EXAM:    General appearance - alert, well appearing, and in no distress  Mental status - normal mood, behavior, speech, dress, motor activity, and thought processes       diarrhea/vomiting

## 2021-06-25 NOTE — TELEPHONE ENCOUNTER
Request for 2nd COVID-19 vaccination due to 1st dose being received at a [facility] facility or vaccination site (i.e. clinic, pharmacy, school, vaccination pop-up clinic). Vaccination received at [facility], now they are out of vaccine. -PROCEED     RN obtained the following information from:Was not able to confirm the following information from the patient/[facility]: (STOP)- DO NOT PLACE ORDER. Refer patient back to where 1st dose was administered so patient can obtain documentation. Advise patient that once they have obtained the documentation, they should call back.     Patient lost her Vaccine Card, advised to call pharmacy she received first dose to obtain information (lot number). If pharmacy is not willing to help patient, RN advised for patient to call [facility] record request to obtain this information. Once information is obtained to call back and any triage RN can help assist patient to ordering 2nd dose appointment for COVID-19 vaccine. Patient verbalized understanding and agrees with plan.     Colette Roy RN, BSN Nurse Triage Advisor 3:56 PM 6/2/2021       Additional Information    General information question, no triage required and triager able to answer question    Protocols used: INFORMATION ONLY CALL-A-AH    COVID 19 Nurse Triage Plan/Patient Instructions    Please be aware that novel coronavirus (COVID-19) may be circulating in the community. If you develop symptoms such as fever, cough, or SOB or if you have concerns about the presence of another infection including coronavirus (COVID-19), please contact your health care provider or visit  [email or web address].    Disposition/Instructions    Additional COVID19 information to add for patients.   How can I protect others?  If you have symptoms (fever, cough, body aches or trouble breathing): Stay home and away from others (self-isolate) until:    At least 10 days have passed since your  "symptoms started, And     You ve had no fever--and no medicine that reduces fever--for 1 full day (24 hours), And      Your other symptoms have resolved (gotten better).     If you don t have symptoms, but a test showed that you have COVID-19 (you tested positive):    Stay home and away from others (self-isolate). Follow the tips under \"How do I self-isolate?\" below for 10 days (20 days if you have a weak immune system).    You don't need to be retested for COVID-19 before going back to school or work. As long as you're fever-free and feeling better, you can go back to school, work and other activities after waiting the 10 or 20 days.     How do I self-isolate?    Stay in your own room, even for meals. Use your own bathroom if you can.     Stay away from others in your home. No hugging, kissing or shaking hands. No visitors.    Don t go to work, school or anywhere else.     Clean  high touch  surfaces often (doorknobs, counters, handles, etc.). Use a household cleaning spray or wipes. You ll find a full list on the EPA website:  www.epa.gov/pesticide-registration/list-n-disinfectants-use-against-sars-cov-2.    Cover your mouth and nose with a mask, tissue or washcloth to avoid spreading germs.    Wash your hands and face often. Use soap and water.    Caregivers in these groups are at risk for severe illness due to COVID-19:  o People 65 years and older  o People who live in a nursing home or long-term care facility  o People with chronic disease (lung, heart, cancer, diabetes, kidney, liver, immunologic)  o People who have a weakened immune system, including those who:  - Are in cancer treatment  - Take medicine that weakens the immune system, such as corticosteroids  - Had a bone marrow or organ transplant  - Have an immune deficiency  - Have poorly controlled HIV or AIDS  - Are obese (body mass index of 40 or higher)  - Smoke regularly    Caregivers should wear gloves while washing dishes, handling laundry and " cleaning bedrooms and bathrooms.    Use caution when washing and drying laundry: Don t shake dirty laundry, and use the warmest water setting that you can.    For more tips, go to www.cdc.gov/coronavirus/2019-ncov/downloads/10Things.pdf.    How can I take care of myself?  1. Get lots of rest. Drink extra fluids (unless a doctor has told you not to).     2. Take Tylenol (acetaminophen) for fever or pain. If you have liver or kidney problems, ask your family doctor if it s okay to take Tylenol.     Adults can take either:     650 mg (two 325 mg pills) every 4 to 6 hours, or     1,000 mg (two 500 mg pills) every 8 hours as needed.     Note: Don t take more than 3,000 mg in one day.   Acetaminophen is found in many medicines (both prescribed and over-the-counter medicines). Read all labels to be sure you don t take too much.     For children, check the Tylenol bottle for the right dose. The dose is based on the child s age or weight.    3. If you have other health problems (like cancer, heart failure, an organ transplant or severe kidney disease): Call your specialty clinic if you don t feel better in the next 2 days.    4. Know when to call 911: Emergency warning signs include:    Trouble breathing or shortness of breath    Pain or pressure in the chest that doesn t go away    Feeling confused like you haven t felt before, or not being able to wake up    Bluish-colored lips or face    What are the symptoms of COVID-19?     The most common symptoms are cough, fever and trouble breathing.     Less common symptoms include body aches, chills, diarrhea (loose, watery poops), fatigue (feeling very tired), headache, runny nose, sore throat and loss of smell.    COVID-19 can cause severe coughing (bronchitis) and lung infection (pneumonia).    How does it spread?     The virus may spread when a person coughs or sneezes into the air. The virus can travel about 6 feet this way, and it can live on surfaces.      Common   (household disinfectants) will kill the virus.    Who is at risk?  Anyone can catch COVID-19 if they re around someone who has the virus.    How can others protect themselves?     Stay away from people who have COVID-19 (or symptoms of COVID-19).    Wash hands often with soap and water. Or, use hand  with at least 60% alcohol.    Avoid touching the eyes, nose or mouth.     Wear a face mask when you go out in public, when sick or when caring for a sick person.    Where can I get more information?     Health Meally: About COVID-19: [email or web address]    CDC: What to Do If You re Sick: www.cdc.gov/coronavirus/2019-ncov/about/steps-when-sick.html    CDC: Ending Home Isolation: www.cdc.gov/coronavirus/2019-ncov/hcp/disposition-in-home-patients.html     CDC: Caring for Someone: www.cdc.gov/coronavirus/2019-ncov/if-you-are-sick/care-for-someone.html     [facility]: Interim Guidance for Hospital Discharge to Home: www.health.[facility].mn./diseases/coronavirus/hcp/hospdischarge.pdf    [facility] clinical trials (COVID-19 research studies): clinicalaffairs.[facility]-clinical-trials     Below are the COVID-19 hotlines at the [place] Department of Health ([facility]). Interpreters are available.   o For health questions: Call 386-035-3725 or 1-375.344.3728 (7 a.m. to 7 p.m.)  o For questions about schools and childcare: Call 311-796-2467 or 1-656.801.8322 (7 a.m. to 7 p.m.)              Thank you for taking steps to prevent the spread of this virus.  o Limit your contact with others.  o Wear a simple mask to cover your cough.  o Wash your hands well and often.    Resources    [facility]: About COVID-19: [email or web address]    CDC: What to Do If You're Sick: www.cdc.gov/coronavirus/2019-ncov/about/steps-when-sick.html    CDC: Ending Home Isolation: www.cdc.gov/coronavirus/2019-ncov/hcp/disposition-in-home-patients.html     CDC: Caring for Someone:  www.cdc.gov/coronavirus/2019-ncov/if-you-are-sick/care-for-someone.html     [facility]: Interim Guidance for Hospital Discharge to Home: www.health.[facility].mn.us/diseases/coronavirus/hcp/hospdischarge.pdf    [facility] clinical trials (COVID-19 research studies): clinicalaffairs.[facility]-clinical-trials     Below are the COVID-19 hotlines at the [place] Department of Health ([facility]). Interpreters are available.   o For health questions: Call 320-536-8079 or 1-944.465.6613 (7 a.m. to 7 p.m.)  o For questions about schools and childcare: Call 272-285-0490 or 1-233.616.5582 (7 a.m. to 7 p.m.)

## 2021-06-25 NOTE — TELEPHONE ENCOUNTER
I am out of the office today.  I did not have this message in my box at the end of the day on Friday  3/15- will have to address tomorrow in the office

## 2021-06-25 NOTE — TELEPHONE ENCOUNTER
Patient calling to see about getting her second covid vaccine dose. She had the first dose at a pharmacy in [place] and they are no longer giving it. Patient does not currently have her vaccine card with the information needed to order the second dose. Patient will call back with the information to have an order placed.    Evie Mccoy RN, BSN Nurse Triage Advisor 3:04 PM 6/2/2021

## 2021-06-25 NOTE — TELEPHONE ENCOUNTER
PT is calling.    Needs to schedule 2nd dose of COVID vaccine.    We are now scheduling all people age 12 and older for COVID-19 vaccines (patients age 12-17 can only  the Pfizer vaccine).     To schedule your appointment please log in to [employer] this link to see when and where we have openings. Appointments open up throughout the week, check back for additional openings.     If there are no appointments left, you will be unable to schedule. If you have technical difficulty using [employer], call [phone] for assistance.  If you would like to be added to our standby list, do that on our website: here.    Patients 12-17 years old must schedule their appointment at a location offering the Pfizer vaccine.  First dose Pfizer vaccines are available at the following sites with convenient hours, including Saturday appointments:    [facility] (former clinic, now serving as a vaccination-only site)    [facility]    Appointments for  Moderna and [place] ([employer]) COVID-19 Vaccines for those 18 years and older is available at many locations in our system.  More information is on our website: [email or web address]. Check this website to stay up to date on COVID-19 vaccination information    Request for 2nd COVID-19 vaccination due to 1st dose being received at a non-[facility] healthcare facility or vaccination site (i.e. clinic, pharmacy, school, vaccination pop-up clinic). Vaccination received at [facility]. -PROCEED     RN obtained the following information from:Patient   Has patient received Monoclonal Antibody Treatment in the previous 90 days?  NO - Okay to Proceed    The name of 1st dose vaccine product received as first dose: Moderna    Date 1st dose vaccination was given: 04/14/2021    Lot #: [id number]    The 2nd dose  of the mRNA COVID-19 vaccine product should be the same vaccine product as the 1st dose and be administered within appropriate timeframe.     Based on the information collected, the patient should receive the vaccine after 05/12/2021 date.     Minda Kincaid RN   [facility] Nurse Advisor  06/11/21 at 3:04 PM

## 2021-06-26 NOTE — TELEPHONE ENCOUNTER
Controlled Substance Refill Request  Medication Name:   Requested Prescriptions     Pending Prescriptions Disp Refills     dextroamphetamine-amphetamine (ADDERALL) 5 mg Tab tablet 30 tablet 0     Sig: Take 1 tablet in the afternoon as needed     dextroamphetamine-amphetamine (ADDERALL XR) 15 MG 24 hr capsule 30 capsule 0     Sig: Take 1 capsule (15 mg total) by mouth daily.     Date Last Fill: 5/25/21  Requested Pharmacy: [facility]  Submit electronically to pharmacy  Controlled Substance Agreement on file:   Encounter-Level CSA Scan Date - 12/09/2016:    Scan on 12/9/2016        Last office visit:  3/9/21  Letha Guadarrama RN, MA  [facility]    Triage Nurse Advisor

## 2021-06-26 NOTE — TELEPHONE ENCOUNTER
Last visit 3/9/21 - CSA and drug screen done at that time    Diagnosis: ADHD  Medication : 1) Adderall xr 15 mg po daily 2) Adderall 5 mg po prn daily  Quantity per month: 1) 30 2)30  Number of refills before follow up visit: 6 months

## 2021-07-04 NOTE — TELEPHONE ENCOUNTER
Telephone Encounter by Sade Henley CMA at 5/25/2021  5:22 PM     Author: Sade Henley CMA Service: -- Author Type: Certified Medical Assistant    Filed: 5/25/2021  5:23 PM Encounter Date: 5/25/2021 Status: Signed    : Sade Henley CMA (Certified Medical Assistant)       Medication: adderall and asserall xr  Last Date Filled 4/20 for both   pulled: YES    Only PCP Prescribing? : YES  Taken as prescribed from physician notes? YES    CSA in last year: YES  Random Utox in last year: YES  (if any of the above answer NO - schedule with PCP)     Opioids + benzodiazepines? NO  (if the above answer YES - schedule with PCP every 6 months)     Is patient on the Executive Review Team? no      All responses suggest: Refilling prescription

## 2021-08-02 ENCOUNTER — MYC REFILL (OUTPATIENT)
Dept: FAMILY MEDICINE | Facility: CLINIC | Age: 26
End: 2021-08-02

## 2021-08-02 DIAGNOSIS — F98.8 ADD (ATTENTION DEFICIT DISORDER) WITHOUT HYPERACTIVITY: ICD-10-CM

## 2021-08-06 RX ORDER — DEXTROAMPHETAMINE SACCHARATE, AMPHETAMINE ASPARTATE, DEXTROAMPHETAMINE SULFATE AND AMPHETAMINE SULFATE 1.25; 1.25; 1.25; 1.25 MG/1; MG/1; MG/1; MG/1
TABLET ORAL
Qty: 30 TABLET | Refills: 0 | Status: SHIPPED | OUTPATIENT
Start: 2021-08-06 | End: 2021-09-13

## 2021-08-06 RX ORDER — DEXTROAMPHETAMINE SACCHARATE, AMPHETAMINE ASPARTATE MONOHYDRATE, DEXTROAMPHETAMINE SULFATE AND AMPHETAMINE SULFATE 3.75; 3.75; 3.75; 3.75 MG/1; MG/1; MG/1; MG/1
15 CAPSULE, EXTENDED RELEASE ORAL DAILY
Qty: 30 CAPSULE | Refills: 0 | Status: SHIPPED | OUTPATIENT
Start: 2021-08-06 | End: 2021-09-13

## 2021-08-06 NOTE — TELEPHONE ENCOUNTER
Routing refill request to provider for review/approval because:  Drugs not on the [facility] [place] refill protocol     Last Written Prescriptions           Last office visit provider:  3/9/21    Requested Prescriptions   Pending Prescriptions Disp Refills     amphetamine-dextroamphetamine (ADDERALL) 5 MG tablet 30 tablet 0       There is no refill protocol information for this order        amphetamine-dextroamphetamine (ADDERALL XR) 15 MG 24 hr capsule 30 capsule 0     Sig: Take 1 capsule (15 mg) by mouth daily       There is no refill protocol information for this order          Kelly Albarran RN 08/05/21 7:51 PM

## 2021-08-06 NOTE — TELEPHONE ENCOUNTER
Medication: Adderall XR 15 mg  Last Date Filled 6/30/31    Medication: Dextroamphetamine-amphetamine 5 mg  Last Date Filled 6/30/21       pulled: YES         Only PCP Prescribing? : YES  Taken as prescribed from physician notes? YES    CSA in last year: YES  Random Utox in last year: YES  Opioids + benzodiazepines? NO    Is patient on the Executive Review Team? NO      All responses suggest: Refilling prescription

## 2021-09-07 ENCOUNTER — OFFICE VISIT (OUTPATIENT)
Dept: FAMILY MEDICINE | Facility: CLINIC | Age: 26
End: 2021-09-07
Payer: COMMERCIAL

## 2021-09-07 VITALS
SYSTOLIC BLOOD PRESSURE: 120 MMHG | TEMPERATURE: 98.2 F | BODY MASS INDEX: 22.66 KG/M2 | WEIGHT: 141 LBS | DIASTOLIC BLOOD PRESSURE: 76 MMHG | HEART RATE: 80 BPM | HEIGHT: 66 IN

## 2021-09-07 DIAGNOSIS — B37.31 YEAST VAGINITIS: ICD-10-CM

## 2021-09-07 DIAGNOSIS — Z00.00 HEALTHCARE MAINTENANCE: ICD-10-CM

## 2021-09-07 DIAGNOSIS — Z30.40 ENCOUNTER FOR SURVEILLANCE OF CONTRACEPTIVES, UNSPECIFIED CONTRACEPTIVE: Primary | ICD-10-CM

## 2021-09-07 PROCEDURE — 99213 OFFICE O/P EST LOW 20 MIN: CPT | Performed by: FAMILY MEDICINE

## 2021-09-07 RX ORDER — LIDOCAINE HYDROCHLORIDE 20 MG/ML
SOLUTION OROPHARYNGEAL
COMMUNITY
Start: 2020-12-09 | End: 2022-02-15

## 2021-09-07 RX ORDER — FLUCONAZOLE 150 MG/1
150 TABLET ORAL ONCE
Qty: 1 TABLET | Refills: 0 | Status: SHIPPED | OUTPATIENT
Start: 2021-09-07 | End: 2021-09-07

## 2021-09-07 ASSESSMENT — MIFFLIN-ST. JEOR: SCORE: 1401.32

## 2021-09-07 NOTE — PROGRESS NOTES
"Assessment & Plan    1. Encounter for surveillance of contraceptives, unspecified contraceptive  This is a 24 yo female with desire for contraception - she has tried multiple methods - has decided she'd like an IUD - nonhormonal; she does not want to do this today.  We discussed appropriate timing for insertion of an IUD.  She will schedule at her convenience, after thinking about this.      2. Yeast vaginitis  Patient notes that she has a current yeast infection - itching/white discharge.  Has used OTC products.  Will treat with Fluconazole.  Discussed.    - fluconazole (DIFLUCAN) 150 MG tablet; Take 1 tablet (150 mg) by mouth once for 1 dose  Dispense: 1 tablet; Refill: 0    3. Healthcare maintenance  Reviewed HM -   - REVIEW OF HEALTH MAINTENANCE PROTOCOL ORDERS      Return in about 1 month (around 10/7/2021) for IUD insertion.    Chief Complaint   Patient presents with     IUD Concerns      HPI  No birth control x 6-9 months   Had a lot of \"trouble\"  Nexplanon - crazy periods; bled all the time or not at all  On the pill - gained weight - didn't feel good, felt down  Tried Nuvaring - same as pill    LMP started 9/1 -     Had \"yeast infection\" -   Used Monistat - instantly hurt a whole lot worse         Patient Active Problem List   Diagnosis     Tension-type Headache     ADD (attention deficit disorder) without hyperactivity     Dysfunctional uterine bleeding     Nexplanon insertion (7/26/16)     Controlled substance agreement signed        Past Medical History:   Diagnosis Date     Anemia         Current Outpatient Medications   Medication     amphetamine-dextroamphetamine (ADDERALL XR) 15 MG 24 hr capsule     amphetamine-dextroamphetamine (ADDERALL) 5 MG tablet     dextroamphetamine-amphetamine (ADDERALL XR) 15 MG 24 hr capsule     dextroamphetamine-amphetamine (ADDERALL) 5 mg Tab tablet     ibuprofen (ADVIL,MOTRIN) 200 MG tablet     lidocaine (XYLOCAINE) 2 % solution     UNABLE TO FIND     No current " facility-administered medications for this visit.        Past Surgical History:   Procedure Laterality Date     TONSILLECTOMY & ADENOIDECTOMY  2006        Social History     Socioeconomic History     Marital status: Single     Spouse name: Not on file     Number of children: Not on file     Years of education: Not on file     Highest education level: Not on file   Occupational History     Not on file   Tobacco Use     Smoking status: Never Smoker     Smokeless tobacco: Never Used   Substance and Sexual Activity     Alcohol use: Yes     Alcohol/week: 5.0 - 7.0 standard drinks     Drug use: No     Sexual activity: Yes     Partners: Male     Birth control/protection: Implant   Other Topics Concern     Not on file   Social History Narrative    Doesn't eat red meat.     Social Determinants of Health     Financial Resource Strain:      Difficulty of Paying Living Expenses:    Food Insecurity:      Worried About Running Out of Food in the Last Year:      Ran Out of Food in the Last Year:    Transportation Needs:      Lack of Transportation (Medical):      Lack of Transportation (Non-Medical):    Physical Activity:      Days of Exercise per Week:      Minutes of Exercise per Session:    Stress:      Feeling of Stress :    Social Connections:      Frequency of Communication with Friends and Family:      Frequency of Social Gatherings with Friends and Family:      Attends Restorationist Services:      Active Member of Clubs or Organizations:      Attends Club or Organization Meetings:      Marital Status:    Intimate Partner Violence:      Fear of Current or Ex-Partner:      Emotionally Abused:      Physically Abused:      Sexually Abused:         Family History   Problem Relation Age of Onset     Polycystic ovary syndrome Sister      Abnormal EKG Mother      Early Death Father         blood infection     ALS Maternal Grandmother      Osteoarthritis Maternal Grandfather      Heart Disease Maternal Grandfather      Lung Cancer  "Paternal Grandmother         smoker     Alzheimer Disease Paternal Grandfather         Review of Systems   Genitourinary: Positive for vaginal discharge (itching).   All other systems reviewed and are negative.       /76 (BP Location: Left arm, Patient Position: Sitting, Cuff Size: Adult Regular)   Pulse 80   Temp 98.2  F (36.8  C) (Temporal)   Ht 1.676 m (5' 6\")   Wt 64 kg (141 lb)   LMP 09/01/2021 (Exact Date)   BMI 22.76 kg/m       Physical Exam  Constitutional:       General: She is not in acute distress.     Appearance: She is well-developed.   HENT:      Right Ear: Tympanic membrane and external ear normal.      Left Ear: Tympanic membrane and external ear normal.      Nose: Nose normal.      Mouth/Throat:      Pharynx: No oropharyngeal exudate.   Eyes:      General:         Right eye: No discharge.         Left eye: No discharge.      Conjunctiva/sclera: Conjunctivae normal.      Pupils: Pupils are equal, round, and reactive to light.   Neck:      Thyroid: No thyromegaly.      Trachea: No tracheal deviation.   Cardiovascular:      Rate and Rhythm: Normal rate and regular rhythm.      Pulses: Normal pulses.      Heart sounds: Normal heart sounds, S1 normal and S2 normal. No murmur heard.   No friction rub. No S3 or S4 sounds.    Pulmonary:      Effort: Pulmonary effort is normal. No respiratory distress.      Breath sounds: Normal breath sounds. No wheezing or rales.   Abdominal:      Palpations: There is no mass.      Tenderness: There is no abdominal tenderness.   Musculoskeletal:         General: Normal range of motion.      Cervical back: Neck supple.   Lymphadenopathy:      Cervical: No cervical adenopathy.   Skin:     General: Skin is warm and dry.      Findings: No rash.   Neurological:      General: No focal deficit present.      Mental Status: She is alert.      Motor: No abnormal muscle tone.      Deep Tendon Reflexes: Reflexes are normal and symmetric.   Psychiatric:         Mood and " Affect: Mood normal.          Results:  No results found for any visits on 09/07/21.    Medications at Conclusion of Visit:  Current Outpatient Medications   Medication Sig Dispense Refill     amphetamine-dextroamphetamine (ADDERALL XR) 15 MG 24 hr capsule Take 1 capsule (15 mg) by mouth daily 30 capsule 0     amphetamine-dextroamphetamine (ADDERALL) 5 MG tablet Take one in the afternoon as needed 30 tablet 0     dextroamphetamine-amphetamine (ADDERALL XR) 15 MG 24 hr capsule [DEXTROAMPHETAMINE-AMPHETAMINE (ADDERALL XR) 15 MG 24 HR CAPSULE] Take 1 capsule (15 mg total) by mouth daily. 30 capsule 0     dextroamphetamine-amphetamine (ADDERALL) 5 mg Tab tablet [DEXTROAMPHETAMINE-AMPHETAMINE (ADDERALL) 5 MG TAB TABLET] Take 1 tablet in the afternoon as needed 30 tablet 0     ibuprofen (ADVIL,MOTRIN) 200 MG tablet [IBUPROFEN (ADVIL,MOTRIN) 200 MG TABLET] Take 200-600 mg by mouth every 6 (six) hours as needed for pain (back pain).       lidocaine (XYLOCAINE) 2 % solution PLEASE SEE ATTACHED FOR DETAILED DIRECTIONS       UNABLE TO FIND [UNABLE TO FIND]            BRANDY OCONNOR MD    6

## 2021-09-13 ENCOUNTER — MYC REFILL (OUTPATIENT)
Dept: FAMILY MEDICINE | Facility: CLINIC | Age: 26
End: 2021-09-13

## 2021-09-13 DIAGNOSIS — F98.8 ADD (ATTENTION DEFICIT DISORDER) WITHOUT HYPERACTIVITY: ICD-10-CM

## 2021-09-14 RX ORDER — DEXTROAMPHETAMINE SACCHARATE, AMPHETAMINE ASPARTATE, DEXTROAMPHETAMINE SULFATE AND AMPHETAMINE SULFATE 1.25; 1.25; 1.25; 1.25 MG/1; MG/1; MG/1; MG/1
TABLET ORAL
Qty: 30 TABLET | Refills: 0 | Status: SHIPPED | OUTPATIENT
Start: 2021-09-15 | End: 2021-10-17

## 2021-09-14 RX ORDER — DEXTROAMPHETAMINE SACCHARATE, AMPHETAMINE ASPARTATE MONOHYDRATE, DEXTROAMPHETAMINE SULFATE AND AMPHETAMINE SULFATE 3.75; 3.75; 3.75; 3.75 MG/1; MG/1; MG/1; MG/1
15 CAPSULE, EXTENDED RELEASE ORAL DAILY
Qty: 30 CAPSULE | Refills: 0 | Status: SHIPPED | OUTPATIENT
Start: 2021-09-15 | End: 2021-10-17

## 2021-09-14 NOTE — TELEPHONE ENCOUNTER
Medication: Adderall XR 15 mg  Last Date Filled 8/16/21    Medication: Dextroamphetamine-amphetamine 5 mg  Last Date Filled 8/16/21     pulled: YES         Only PCP Prescribing? : NO  Taken as prescribed from physician notes? YES    CSA in last year: YES  Random Utox in last year: YES  Opioids + benzodiazepines? NO    Is patient on the Executive Review Team? NO    All responses suggest: Refilling prescription

## 2021-09-14 NOTE — TELEPHONE ENCOUNTER
Routing refill request to provider for review/approval because:  Controlled medication refill request.  Routing to provider's care team.    Last Written Prescription:      Last office visit provider:  3/9/21        Requested Prescriptions   Pending Prescriptions Disp Refills     amphetamine-dextroamphetamine (ADDERALL) 5 MG tablet 30 tablet 0     Sig: Take one in the afternoon as needed       There is no refill protocol information for this order        amphetamine-dextroamphetamine (ADDERALL XR) 15 MG 24 hr capsule 30 capsule 0     Sig: Take 1 capsule (15 mg) by mouth daily       There is no refill protocol information for this order            Pratibha Aguirre RN 09/13/21 7:25 PM

## 2021-10-03 ENCOUNTER — HEALTH MAINTENANCE LETTER (OUTPATIENT)
Age: 26
End: 2021-10-03

## 2021-10-13 ENCOUNTER — OFFICE VISIT (OUTPATIENT)
Dept: FAMILY MEDICINE | Facility: CLINIC | Age: 26
End: 2021-10-13
Payer: COMMERCIAL

## 2021-10-13 VITALS
DIASTOLIC BLOOD PRESSURE: 79 MMHG | TEMPERATURE: 97.8 F | OXYGEN SATURATION: 100 % | SYSTOLIC BLOOD PRESSURE: 117 MMHG | WEIGHT: 146 LBS | BODY MASS INDEX: 23.57 KG/M2 | HEART RATE: 88 BPM

## 2021-10-13 DIAGNOSIS — Z30.430 ENCOUNTER FOR INSERTION OF INTRAUTERINE CONTRACEPTIVE DEVICE: Primary | ICD-10-CM

## 2021-10-13 LAB — HCG UR QL: NEGATIVE

## 2021-10-13 PROCEDURE — 58300 INSERT INTRAUTERINE DEVICE: CPT | Performed by: FAMILY MEDICINE

## 2021-10-13 PROCEDURE — 81025 URINE PREGNANCY TEST: CPT | Performed by: FAMILY MEDICINE

## 2021-10-13 RX ORDER — COPPER 313.4 MG/1
1 INTRAUTERINE DEVICE INTRAUTERINE ONCE
Status: COMPLETED
Start: 2021-10-13 | End: 2021-10-13

## 2021-10-13 RX ADMIN — COPPER 1 EACH: 313.4 INTRAUTERINE DEVICE INTRAUTERINE at 17:41

## 2021-10-13 NOTE — PROGRESS NOTES
Assessment & Plan    1. Encounter for insertion of intrauterine contraceptive device  This is a 26 yo female with desire for contraception - and desires non-hormonal IUD.  Discussed at previous visit.  Reviewed - consent obtained.  Pregnancy test negative.  See procedure note.   - HCG qualitative urine; Future  - HCG qualitative urine  - paragard intrauterine copper IUD device 1 each     GYN: Insert IUD    Date/Time: 10/13/2021 2:20 PM  Performed by: Alondra Paredes MD  Authorized by: Alondra Paredes MD     Consent:     Consent obtained:  Verbal    Consent given by:  Patient    Procedure risks and benefits discussed: yes      Patient questions answered: yes      Patient agrees, verbalizes understanding, and wants to proceed: yes      Instructions and paperwork completed: yes    Procedure:     Negative urine pregnancy test: yes      Cervix cleaned and prepped: yes      Speculum placed in vagina: yes      Tenaculum applied to cervix: yes      Uterus sounded: yes      Uterus sound depth (cm):  7.5    IUD inserted with no complications: yes      IUD type:  ParaGard    Strings trimmed: yes (1 cm)    Post-procedure:     Patient tolerated procedure well: yes      Patient will follow up after next period: yes            No follow-ups on file.    Chief Complaint   Patient presents with     IUD      HPI  Desires IUD - no hormone  Discussed previously   Not pregnant -        Patient Active Problem List   Diagnosis     Tension-type Headache     ADD (attention deficit disorder) without hyperactivity     Dysfunctional uterine bleeding     Nexplanon insertion (7/26/16)     Controlled substance agreement signed        Past Medical History:   Diagnosis Date     Anemia         Current Outpatient Medications   Medication     amphetamine-dextroamphetamine (ADDERALL XR) 15 MG 24 hr capsule     amphetamine-dextroamphetamine (ADDERALL) 5 MG tablet     dextroamphetamine-amphetamine (ADDERALL XR) 15 MG 24 hr  capsule     dextroamphetamine-amphetamine (ADDERALL) 5 mg Tab tablet     ibuprofen (ADVIL,MOTRIN) 200 MG tablet     lidocaine (XYLOCAINE) 2 % solution     UNABLE TO FIND     Current Facility-Administered Medications   Medication     paragard intrauterine copper IUD device 1 each        Past Surgical History:   Procedure Laterality Date     TONSILLECTOMY & ADENOIDECTOMY  2006        Social History     Socioeconomic History     Marital status: Single     Spouse name: Not on file     Number of children: Not on file     Years of education: Not on file     Highest education level: Not on file   Occupational History     Not on file   Tobacco Use     Smoking status: Never Smoker     Smokeless tobacco: Never Used   Substance and Sexual Activity     Alcohol use: Yes     Alcohol/week: 5.0 - 7.0 standard drinks     Drug use: No     Sexual activity: Yes     Partners: Male     Birth control/protection: Implant   Other Topics Concern     Not on file   Social History Narrative    Doesn't eat red meat.     Social Determinants of Health     Financial Resource Strain:      Difficulty of Paying Living Expenses:    Food Insecurity:      Worried About Running Out of Food in the Last Year:      Ran Out of Food in the Last Year:    Transportation Needs:      Lack of Transportation (Medical):      Lack of Transportation (Non-Medical):    Physical Activity:      Days of Exercise per Week:      Minutes of Exercise per Session:    Stress:      Feeling of Stress :    Social Connections:      Frequency of Communication with Friends and Family:      Frequency of Social Gatherings with Friends and Family:      Attends Hindu Services:      Active Member of Clubs or Organizations:      Attends Club or Organization Meetings:      Marital Status:    Intimate Partner Violence:      Fear of Current or Ex-Partner:      Emotionally Abused:      Physically Abused:      Sexually Abused:         Family History   Problem Relation Age of Onset      Polycystic ovary syndrome Sister      Abnormal EKG Mother      Early Death Father         blood infection     ALS Maternal Grandmother      Osteoarthritis Maternal Grandfather      Heart Disease Maternal Grandfather      Lung Cancer Paternal Grandmother         smoker     Alzheimer Disease Paternal Grandfather         Review of Systems   All other systems reviewed and are negative.       /79 (BP Location: Left arm, Patient Position: Sitting, Cuff Size: Adult Regular)   Pulse 88   Temp 97.8  F (36.6  C)   Wt 66.2 kg (146 lb)   SpO2 100%   BMI 23.57 kg/m       Physical Exam  Genitourinary:     Comments: PELVIC EXAM:External genitalia: normal  Vaginal mucosa normal  Vaginal discharge: minimal  Speculum exam shows a normal appearing closed, nulliparous cervix .            Results:  Results for orders placed or performed in visit on 10/13/21   HCG qualitative urine     Status: Normal   Result Value Ref Range    hCG Urine Qualitative Negative Negative       Medications at Conclusion of Visit:  Current Outpatient Medications   Medication Sig Dispense Refill     amphetamine-dextroamphetamine (ADDERALL XR) 15 MG 24 hr capsule Take 1 capsule (15 mg) by mouth daily 30 capsule 0     amphetamine-dextroamphetamine (ADDERALL) 5 MG tablet Take one in the afternoon as needed 30 tablet 0     dextroamphetamine-amphetamine (ADDERALL XR) 15 MG 24 hr capsule [DEXTROAMPHETAMINE-AMPHETAMINE (ADDERALL XR) 15 MG 24 HR CAPSULE] Take 1 capsule (15 mg total) by mouth daily. 30 capsule 0     dextroamphetamine-amphetamine (ADDERALL) 5 mg Tab tablet [DEXTROAMPHETAMINE-AMPHETAMINE (ADDERALL) 5 MG TAB TABLET] Take 1 tablet in the afternoon as needed 30 tablet 0     ibuprofen (ADVIL,MOTRIN) 200 MG tablet [IBUPROFEN (ADVIL,MOTRIN) 200 MG TABLET] Take 200-600 mg by mouth every 6 (six) hours as needed for pain (back pain).       lidocaine (XYLOCAINE) 2 % solution PLEASE SEE ATTACHED FOR DETAILED DIRECTIONS       UNABLE TO FIND [UNABLE TO  FIND]            BRANDY OCONNOR MD

## 2021-10-17 ENCOUNTER — MYC REFILL (OUTPATIENT)
Dept: FAMILY MEDICINE | Facility: CLINIC | Age: 26
End: 2021-10-17

## 2021-10-17 DIAGNOSIS — Z79.899 CONTROLLED SUBSTANCE AGREEMENT SIGNED: ICD-10-CM

## 2021-10-17 DIAGNOSIS — F98.8 ADD (ATTENTION DEFICIT DISORDER) WITHOUT HYPERACTIVITY: ICD-10-CM

## 2021-10-18 NOTE — TELEPHONE ENCOUNTER
Routing refill request to provider for review/approval because:  Controlled substance request    Last Written Prescription Date:  9/14/21  Last Fill Quantity: 30,  # refills: 0   Last office visit provider:  10/13/21     Requested Prescriptions   Pending Prescriptions Disp Refills     amphetamine-dextroamphetamine (ADDERALL) 5 MG tablet 30 tablet 0     Sig: Take one in the afternoon as needed       There is no refill protocol information for this order        amphetamine-dextroamphetamine (ADDERALL XR) 15 MG 24 hr capsule 30 capsule 0     Sig: Take 1 capsule (15 mg) by mouth daily       There is no refill protocol information for this order          Laith Kat RN 10/18/21 1:00 PM

## 2021-10-19 DIAGNOSIS — F98.8 ADD (ATTENTION DEFICIT DISORDER) WITHOUT HYPERACTIVITY: ICD-10-CM

## 2021-10-19 RX ORDER — DEXTROAMPHETAMINE SACCHARATE, AMPHETAMINE ASPARTATE, DEXTROAMPHETAMINE SULFATE AND AMPHETAMINE SULFATE 1.25; 1.25; 1.25; 1.25 MG/1; MG/1; MG/1; MG/1
TABLET ORAL
Qty: 30 TABLET | Refills: 0 | Status: SHIPPED | OUTPATIENT
Start: 2021-10-19 | End: 2021-11-26

## 2021-10-19 RX ORDER — DEXTROAMPHETAMINE SACCHARATE, AMPHETAMINE ASPARTATE MONOHYDRATE, DEXTROAMPHETAMINE SULFATE AND AMPHETAMINE SULFATE 3.75; 3.75; 3.75; 3.75 MG/1; MG/1; MG/1; MG/1
15 CAPSULE, EXTENDED RELEASE ORAL DAILY
Qty: 30 CAPSULE | Refills: 0 | Status: SHIPPED | OUTPATIENT
Start: 2021-10-19 | End: 2021-11-24

## 2021-10-19 NOTE — TELEPHONE ENCOUNTER
Medication:Adderall XR 15 mg  Last Date Filled 9/14/21    Medication: Adderall 5 mg  Last Date Filled 9/14/21       pulled: PROVIDER TO PULL FROM Epic.     Only PCP Prescribing? PROVIDER TO PULL  FROM Epic.  Taken as prescribed from physician notes? YES    CSA in last year: YES  Random Utox in last year: YES  Opioids + benzodiazepines? NO    Is patient on the Executive Review Team? No      All responses suggest: Refilling prescription

## 2021-10-19 NOTE — CONFIDENTIAL NOTE
Diagnosis ADHD  Medication : Adderall XR 15 mg, adderall 5 mg  Quantity per month: 30 each  No of refills before follow up visit: 6 months - may be by [facility] for one of follow up visits  CSA signed : 3/9/21  Urine drug screen: 3/9/21    Due for Follow up visit  - I will send a message        09/15/2021  1   09/14/2021  Dextroamp-Amphetamine 5 MG Tab    30.00  30 Ma Win   [id number]   St (1118)   [place]   09/15/2021  1   09/14/2021  Adderall Xr 15 MG Capsule    30.00  30 Ma Win   [id number]   St (1118)   [place]   08/14/2021  1   08/06/2021  Adderall Xr 15 MG Capsule    30.00  30 Ma Win   [id number]   St (1118)   [place]   08/14/2021  1   08/06/2021  Dextroamp-Amphetamine 5 MG Tab    30.00  30 Ma Win   [id number]   St (1118)   [place]   06/24/2021  1   06/23/2021  Dextroamp-Amphetamine 5 MG Tab    30.00  30 Ma Win   [id number]   St (1118[place]   [place]   06/24/2021  1   06/23/2021  Adderall Xr 15 MG Capsule    30.00  30 Ma Win   [id number]   St (1118[place]   [place]   05/25/2021  1   05/25/2021  Dextroamp-Amphetamine 5 MG Tab    30.00  30 Ma Win   [id number]   St (1118)   [place]   05/25/2021  1   05/25/2021  Adderall Xr 15 MG Capsule    30.00  30 Ma Win   [id number]   St (1118)   [place]   04/20/2021  1   04/20/2021  Adderall Xr 15 MG Capsule    30.00  30 Ma Win   [id number]   St (1118)   [place]   04/20/2021  1   04/20/2021  Dextroamp-Amphetamine 5 MG Tab    30.00  30 Ma Win   [id number]   St (1118)   0/0   Comm Ins

## 2021-10-21 RX ORDER — DEXTROAMPHETAMINE SACCHARATE, AMPHETAMINE ASPARTATE, DEXTROAMPHETAMINE SULFATE AND AMPHETAMINE SULFATE 1.25; 1.25; 1.25; 1.25 MG/1; MG/1; MG/1; MG/1
TABLET ORAL
Qty: 30 TABLET | Refills: 0 | OUTPATIENT
Start: 2021-10-21

## 2021-10-21 RX ORDER — DEXTROAMPHETAMINE/AMPHETAMINE 15 MG
CAPSULE, EXT RELEASE 24 HR ORAL
Qty: 30 CAPSULE | Refills: 0 | OUTPATIENT
Start: 2021-10-21

## 2021-11-24 ENCOUNTER — MYC REFILL (OUTPATIENT)
Dept: FAMILY MEDICINE | Facility: CLINIC | Age: 26
End: 2021-11-24
Payer: COMMERCIAL

## 2021-11-24 DIAGNOSIS — F98.8 ADD (ATTENTION DEFICIT DISORDER) WITHOUT HYPERACTIVITY: ICD-10-CM

## 2021-11-24 RX ORDER — DEXTROAMPHETAMINE SACCHARATE, AMPHETAMINE ASPARTATE, DEXTROAMPHETAMINE SULFATE AND AMPHETAMINE SULFATE 1.25; 1.25; 1.25; 1.25 MG/1; MG/1; MG/1; MG/1
TABLET ORAL
Qty: 30 TABLET | Refills: 0 | Status: CANCELLED | OUTPATIENT
Start: 2021-11-24

## 2021-11-26 RX ORDER — DEXTROAMPHETAMINE SACCHARATE, AMPHETAMINE ASPARTATE MONOHYDRATE, DEXTROAMPHETAMINE SULFATE AND AMPHETAMINE SULFATE 3.75; 3.75; 3.75; 3.75 MG/1; MG/1; MG/1; MG/1
15 CAPSULE, EXTENDED RELEASE ORAL DAILY
Qty: 30 CAPSULE | Refills: 0 | Status: SHIPPED | OUTPATIENT
Start: 2021-11-26 | End: 2021-12-30

## 2021-11-26 RX ORDER — DEXTROAMPHETAMINE SACCHARATE, AMPHETAMINE ASPARTATE, DEXTROAMPHETAMINE SULFATE AND AMPHETAMINE SULFATE 1.25; 1.25; 1.25; 1.25 MG/1; MG/1; MG/1; MG/1
TABLET ORAL
Qty: 30 TABLET | Refills: 0 | Status: SHIPPED | OUTPATIENT
Start: 2021-11-26 | End: 2021-12-30

## 2021-11-26 NOTE — CONFIDENTIAL NOTE
Fill Date ID   Written Drug Qty Days Prescriber Rx # Pharmacy Refill   Daily Dose* Pymt Type      10/20/2021  1   10/19/2021  Adderall Xr 15 MG Capsule    30.00  30 Ma Win   [id number]   St (1118)   [place]   10/20/2021  1   10/19/2021  Dextroamp-Amphetamine 5 MG Tab    30.00  30 Ma Win   [id number]   St (1118)   [place]   09/15/2021  1   09/14/2021  Adderall Xr 15 MG Capsule    30.00  30 Ma Win   [id number]   St (1118)   [place]   09/15/2021  1   09/14/2021  Dextroamp-Amphetamine 5 MG Tab    30.00  30 Ma Win   [id number]   St (1118)   [place]       Diagnosis: ADHD  Medication : 1) Adderall xr 15 mg po daily 2) Adderall 5 mg po prn daily  Quantity per month: 1) 30 2)30  Number of refills before follow up visit: 6 months

## 2021-11-26 NOTE — TELEPHONE ENCOUNTER
Medication: Adderall  Last Date Filled 10/19/21   pulled: PROVIDER TO PULL FROM Epic.     Only PCP Prescribing? PROVIDER TO PULL  FROM Epic.  Taken as prescribed from physician notes? YES    CSA in last year: YES  Random Utox in last year: YES  (if any of the above answer NO - schedule with PCP)     Opioids + benzodiazepines? NO  (if the above answer YES - schedule with PCP every 6 months)     Is patient on the Executive Review Team? No      All responses suggest: PCP to review and advise        
Routing refill request to provider for review/approval because:  Controlled medication refill request.  Routing to provider's care team.    Last Written Prescription:       Last office visit provider:  10/13/21      Requested Prescriptions   Pending Prescriptions Disp Refills     amphetamine-dextroamphetamine (ADDERALL) 5 MG tablet 30 tablet 0     Sig: Take one in the afternoon as needed       There is no refill protocol information for this order        amphetamine-dextroamphetamine (ADDERALL XR) 15 MG 24 hr capsule 30 capsule 0     Sig: Take 1 capsule (15 mg) by mouth daily       There is no refill protocol information for this order            Pratibha Aguirre RN 11/26/21 6:39 AM  
Home

## 2021-12-30 ENCOUNTER — MYC REFILL (OUTPATIENT)
Dept: FAMILY MEDICINE | Facility: CLINIC | Age: 26
End: 2021-12-30
Payer: COMMERCIAL

## 2021-12-30 DIAGNOSIS — F98.8 ADD (ATTENTION DEFICIT DISORDER) WITHOUT HYPERACTIVITY: ICD-10-CM

## 2022-01-01 NOTE — TELEPHONE ENCOUNTER
Routing refill request to provider for review/approval because:  Controlled substance request    Last Written Prescription Date:  11/26/21  Last Fill Quantity: 30,  # refills: 0   Last office visit provider:  10/13/21     Requested Prescriptions   Pending Prescriptions Disp Refills     amphetamine-dextroamphetamine (ADDERALL XR) 15 MG 24 hr capsule 30 capsule 0     Sig: Take 1 capsule (15 mg) by mouth daily Due for Follow up visit before further refills - maybe evisit       There is no refill protocol information for this order        amphetamine-dextroamphetamine (ADDERALL) 5 MG tablet 30 tablet 0     Sig: Take one in the afternoon as needed. Due for Follow up visit before further refills - maybe evisit       There is no refill protocol information for this order          amelia jim RN 01/01/22 6:47 AM

## 2022-01-03 NOTE — TELEPHONE ENCOUNTER
Medication: Adderall 5 mg  Last Date Filled 11/26/21    Medication: Adderall XR 15 mg  Last Date Filled 11/26/21     pulled: PROVIDER TO PULL FROM Epic.   Only PCP Prescribing? PROVIDER TO PULL  FROM Epic.    Taken as prescribed from physician notes?    CSA in last year: YES  Random Utox in last year: YES  On opioids in addition to benzodiazepines? NO    Is patient on the Executive Review Team? NO    All responses suggest: Refilling prescription

## 2022-01-04 RX ORDER — DEXTROAMPHETAMINE SACCHARATE, AMPHETAMINE ASPARTATE MONOHYDRATE, DEXTROAMPHETAMINE SULFATE AND AMPHETAMINE SULFATE 3.75; 3.75; 3.75; 3.75 MG/1; MG/1; MG/1; MG/1
15 CAPSULE, EXTENDED RELEASE ORAL DAILY
Qty: 30 CAPSULE | Refills: 0 | Status: SHIPPED | OUTPATIENT
Start: 2022-01-04 | End: 2022-02-07

## 2022-01-04 RX ORDER — DEXTROAMPHETAMINE SACCHARATE, AMPHETAMINE ASPARTATE, DEXTROAMPHETAMINE SULFATE AND AMPHETAMINE SULFATE 1.25; 1.25; 1.25; 1.25 MG/1; MG/1; MG/1; MG/1
TABLET ORAL
Qty: 30 TABLET | Refills: 0 | Status: SHIPPED | OUTPATIENT
Start: 2022-01-04 | End: 2022-02-07

## 2022-01-04 NOTE — TELEPHONE ENCOUNTER
11/26/2021  1   11/26/2021  Adderall Xr 15 MG Capsule    30.00  30 Ma Win   [id number]   St (1118)   [place]   11/26/2021  1   11/26/2021  Dextroamp-Amphetamine 5 MG Tab    30.00  30 Ma Win   [id number]   St (1118)   [place]   10/20/2021  1   10/19/2021  Adderall Xr 15 MG Capsule    30.00  30 Ma Win   [id number]   St (1118)   [place]   10/20/2021  1   10/19/2021  Dextroamp-Amphetamine 5 MG Tab    30.00  30 Ma Win   [id number]   St (1118)   [place]   09/15/2021  1   09/14/2021  Dextroamp-Amphetamine 5 MG Tab    30.00  30 Ma Win   [id number]   St (1118)   [place]   09/15/2021  1   09/14/2021  Adderall Xr 15 MG Capsule    30.00  30 Ma Win   [id number]   St (1118[place]   [place]   08/14/2021  1   08/06/2021  Adderall Xr 15 MG Capsule    30.00  30 Ma Win   [id number]   St (1118[place]   [place]   08/14/2021  1   08/06/2021  Dextroamp-Amphetamine 5 MG Tab    30.00  30 Ma Win   [id number]   St (1118[place]   [place]       Overdue for Follow up visit for ADHD  - will message her

## 2022-02-06 ENCOUNTER — MYC MEDICAL ADVICE (OUTPATIENT)
Dept: FAMILY MEDICINE | Facility: CLINIC | Age: 27
End: 2022-02-06
Payer: COMMERCIAL

## 2022-02-06 DIAGNOSIS — F98.8 ADD (ATTENTION DEFICIT DISORDER) WITHOUT HYPERACTIVITY: ICD-10-CM

## 2022-02-07 RX ORDER — DEXTROAMPHETAMINE SACCHARATE, AMPHETAMINE ASPARTATE MONOHYDRATE, DEXTROAMPHETAMINE SULFATE AND AMPHETAMINE SULFATE 3.75; 3.75; 3.75; 3.75 MG/1; MG/1; MG/1; MG/1
15 CAPSULE, EXTENDED RELEASE ORAL DAILY
Qty: 30 CAPSULE | Refills: 0 | Status: SHIPPED | OUTPATIENT
Start: 2022-02-07 | End: 2022-03-10

## 2022-02-07 RX ORDER — DEXTROAMPHETAMINE SACCHARATE, AMPHETAMINE ASPARTATE, DEXTROAMPHETAMINE SULFATE AND AMPHETAMINE SULFATE 1.25; 1.25; 1.25; 1.25 MG/1; MG/1; MG/1; MG/1
TABLET ORAL
Qty: 30 TABLET | Refills: 0 | Status: SHIPPED | OUTPATIENT
Start: 2022-02-07 | End: 2022-03-10

## 2022-02-07 NOTE — TELEPHONE ENCOUNTER
01/07/2022  1   01/04/2022  Dextroamp-Amphet Er 15 MG Cap    30.00  30 Ma Win   [id number]   St (1118[place]   [place]   01/07/2022  1   01/04/2022  Dextroamp-Amphetamine 5 MG Tab    30.00  30 Ma Win   [id number]   St (1118[place]   11/26/2021  1   11/26/2021  Adderall Xr 15 MG Capsule    30.00  30 Ma Win   [id number]   St (1118)   [place]   11/26/2021  1   11/26/2021  Dextroamp-Amphetamine 5 MG Tab    30.00  30 Ma Win   [id number]   St (1118)   [place]   10/20/2021  1   10/19/2021  Dextroamp-Amphetamine 5 MG Tab    30.00  30 Ma Win   [id number]   St (1118[place]   10/20/2021  1   10/19/2021  Adderall Xr 15 MG Capsule    30.00  30 Ma Win   [id number]   St (1118)   [place]   09/15/2021  1   09/14/2021  Adderall Xr 15 MG Capsule    30.00  30 Ma Win   [id number]   St (1118)   [place]

## 2022-02-15 ENCOUNTER — OFFICE VISIT (OUTPATIENT)
Dept: FAMILY MEDICINE | Facility: CLINIC | Age: 27
End: 2022-02-15
Payer: MEDICAID

## 2022-02-15 VITALS
SYSTOLIC BLOOD PRESSURE: 100 MMHG | HEART RATE: 100 BPM | DIASTOLIC BLOOD PRESSURE: 64 MMHG | WEIGHT: 147.6 LBS | RESPIRATION RATE: 18 BRPM | OXYGEN SATURATION: 97 % | HEIGHT: 66 IN | BODY MASS INDEX: 23.72 KG/M2

## 2022-02-15 DIAGNOSIS — F98.8 ADD (ATTENTION DEFICIT DISORDER) WITHOUT HYPERACTIVITY: Primary | ICD-10-CM

## 2022-02-15 DIAGNOSIS — Z79.899 CONTROLLED SUBSTANCE AGREEMENT SIGNED: ICD-10-CM

## 2022-02-15 DIAGNOSIS — Z23 IMMUNIZATION DUE: ICD-10-CM

## 2022-02-15 LAB
AMPHETAMINES UR QL SCN: ABNORMAL
BARBITURATES UR QL: ABNORMAL
BENZODIAZ UR QL: ABNORMAL
CANNABINOIDS UR QL SCN: ABNORMAL
COCAINE UR QL: ABNORMAL
CREAT UR-MCNC: 307 MG/DL
OPIATES UR QL SCN: ABNORMAL
OXYCODONE UR QL: ABNORMAL
PCP UR QL SCN: ABNORMAL

## 2022-02-15 PROCEDURE — 91306 COVID-19,PF,MODERNA (18+ YRS BOOSTER .25ML): CPT | Performed by: FAMILY MEDICINE

## 2022-02-15 PROCEDURE — 80307 DRUG TEST PRSMV CHEM ANLYZR: CPT | Performed by: FAMILY MEDICINE

## 2022-02-15 PROCEDURE — 0064A COVID-19,PF,MODERNA (18+ YRS BOOSTER .25ML): CPT | Performed by: FAMILY MEDICINE

## 2022-02-15 PROCEDURE — 99213 OFFICE O/P EST LOW 20 MIN: CPT | Performed by: FAMILY MEDICINE

## 2022-02-15 ASSESSMENT — MIFFLIN-ST. JEOR: SCORE: 1426.26

## 2022-02-15 NOTE — LETTER
[facility]  02/15/22  Patient: Aidee Cortes  YOB: 1995  Medical Record Number: [id number]                                                                                  Non-Opioid Controlled Substance Agreement  Diagnosis: ADHD  Medication : 1) Adderall xr 15 mg po daily 2) Adderall 5 mg po prn daily  Quantity per month: 1) 30 2)30  Number of refills before follow up visit: 6 months    This is an agreement between you and your provider regarding safe and appropriate use of controlled substances prescribed by your care team. Controlled substances are?medicines that can cause physical and mental dependence (abuse).     There are strict laws about having and using these medicines. We here at [facility] are  committed to working with you in your efforts to get better. To support you in this work, we'll help you schedule regular office appointments for medicine refills. If we must cancel or change your appointment for any reason, we'll make sure you have enough medicine to last until your next appointment.     As a Provider, I will:     Listen carefully to your concerns while treating you with respect.     Recommend a treatment plan that I believe is in your best interest and may involve therapies other than medicine.      Talk with you often about the possible benefits and the risk of harm of any medicine that we prescribe for you.    Assess the safety of this medicine and check how well it works.      Provide a plan on how to taper (discontinue or go off) using this medicine if the decision is made to stop its use.      ::  As a Patient, I understand controlled substances:       Are prescribed by my care provider to help me function or work and manage my condition(s).?    Are strong medicines and can cause serious side effects.       Need to be taken exactly as prescribed.?Combining controlled substances with certain medicines or chemicals (such as illegal drugs,  alcohol, sedatives, sleeping pills, and benzodiazepines) can be dangerous or even fatal.? If I stop taking my medicines suddenly, I may have severe withdrawal symptoms.     The risks, benefits, and side effects of these medicine(s) were explained to me. I agree that:    1. I will take part in other treatments as advised by my care team. This may be psychiatry or counseling, physical therapy, behavioral therapy, group treatment or a referral to specialist.  2. I will keep all my appointments and understand this is part of the monitoring of controlled substances.?My care team may require an office visit for EVERY controlled substance refill. If I miss appointments or don t follow instructions, my care team may stop my medicine  3. I will take my medicines as prescribed. I will not change the dose or schedule unless my care team tells me to. There will be no refills if I run out early.      4. I may be asked to come to the clinic and complete a urine drug test or complete a pill count. If I don t give a urine sample or participate in a pill count, the care team may stop my medicine.    5. I will only receive controlled substance prescriptions from this clinic. If I am treated by another provider, I will tell them that I am taking controlled substances and that I have a treatment agreement with this provider. I will inform my [facility] care team within one business day if I am given a prescription for any controlled substance by another healthcare provider. My [facility] care team can contact other providers and pharmacists about my use of any medicines.  6. It is up to me to make sure that I don't run out of my medicines on weekends or holidays.?If my care team is willing to refill my prescription without a visit, I must request refills only during office hours. Refills may take up to 3 business days to process. I will use one pharmacy to fill all my controlled substance prescriptions. I will notify the  clinic about any changes to my insurance or medicine availability.  7. I am responsible for my prescriptions. If the medicine/prescription is lost, stolen or destroyed, it will not be replaced.?I also agree not to share controlled substance medicines with anyone.   8. I am aware I should not use any illegal or recreational drugs. I agree not to drink alcohol unless my care team says I can.   9. If I enroll in the [place] Medical Cannabis program, I will tell my care team before my next refill.  10. I will tell my care team right away if I become pregnant, have a new medical problem treated outside of my regular clinic, or have a change in my medicines.   11. I understand that this medicine can affect my thinking, judgment and reaction time.? Alcohol and drugs affect the brain and body, which can affect the safety of my driving. Being under the influence of alcohol or drugs can affect my decision-making, behaviors, personal safety and the safety of others. Driving while impaired (DWI) can occur if a person is driving, operating or in physical control of a car, motorcycle, boat, snowmobile, ATV, motorbike, off-road vehicle or any other motor vehicle ([place] Statute 169A.20). I understand the risk if I choose to drive or operate any vehicle or machinery.    I understand that if I do not follow any of the conditions above, my prescriptions or treatment may be stopped or changed.   I agree that my provider, clinic care team and pharmacy may work with any city, state or federal law enforcement agency that investigates the misuse, sale or other diversion of my controlled medicine. I will allow my provider to discuss my care with, or share a copy of, this agreement with any other treating provider, pharmacy or emergency room where I receive care.     I have read this agreement and have asked questions about anything I did not understand.    ________________________________________________________  Patient Signature - Aidee  Louise Tilsen     ___________________                   Date     ________________________________________________________  Provider Signature - Yesy Sully, MD       ___________________                   Date     ________________________________________________________  Witness Signature (required if provider not present while patient signing)          ___________________                   Date

## 2022-02-15 NOTE — PROGRESS NOTES
"Assessment and Plan    ADD (attention deficit disorder) without hyperactivity  Well controlled on: Adderall XR 15 mg in am , and IR 5 mg in afternoon    Controlled substance agreement signed  For above medicaiton  - Urine Drugs of Abuse Screen Panel 1 - Drug Screen (Full)    Immunization due  - COVID-19,PF,MODERNA (18+ YRS BOOSTER .25ML)       Patient Instructions   n 6 months request an e-visit for \"other\" and that can be our follow up for ADHD       Return in about 6 months (around 8/15/2022) for Follow up.    Khushi Robles MD     -------------------------------------------  Answers for HPI/ROS submitted by the patient on 2/15/2022  How many servings of fruits and vegetables do you eat daily?: 0-1  On average, how many sweetened beverages do you drink each day (Examples: soda, juice, sweet tea, etc.  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  She lives with her Mom and the dog - Brandy, a small dog -  they adopted from her sister. She notes being told the second year would be easier, but finds this NOT to be the case. 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 She also didn't take classes from [employer] term    She occasionally fills in as a  at the [employer] in times off      How much sleep do you get on a typical night?  Good about trying to get 8 hours    Do you have any trouble sleeping?   no not normally, but sometimes after a  long day    Do use drink caffeinated beverages , and if so how many in a day on average?  One cup in the am, 1-2 diet cokes in afternoon      Are having any heart palpitations?  no    Are you having any " "decreased appetite?  no    Have you been losing any weight?  No - gaining, but likely due to decreased exercise    What does your medication help you do at work and at home?  Focus on school and associated work        ----    Aidee had copper IUD inserted and now has  more cramps  And wonders if this in normal (discussed, uniforately for some, yes) - alternate ibuprofen and tylenol for pain  She arrived at the decision to use the copper iud because   -  had nexplanon - bled constantly   - When on the pill - depressed and wu - same with ring      Current Outpatient Medications   Medication     ibuprofen (ADVIL,MOTRIN) 200 MG tablet     amphetamine-dextroamphetamine (ADDERALL XR) 15 MG 24 hr capsule     amphetamine-dextroamphetamine (ADDERALL) 5 MG tablet     dextroamphetamine-amphetamine (ADDERALL XR) 15 MG 24 hr capsule     dextroamphetamine-amphetamine (ADDERALL) 5 mg Tab tablet     No current facility-administered medications for this visit.       The history section was last reviewed by Khushi Greene LPN on Feb 15, 2022.    History   Smoking Status     Never Smoker   Smokeless Tobacco     Never Used       Social History    Substance and Sexual Activity      Alcohol use: Yes        Alcohol/week: 5.0 - 7.0 standard drinks        /64 (BP Location: Left arm, Patient Position: Sitting, Cuff Size: Adult Regular)   Pulse 100   Resp 18   Ht 1.676 m (5' 6\")   Wt 67 kg (147 lb 9.6 oz)   LMP 01/22/2022 (Approximate)   SpO2 97%   BMI 23.82 kg/m    Body mass index is 23.82 kg/m .     EXAM:    General appearance - alert, well appearing, and in no distress  Mental status - normal mood, behavior, speech, dress, motor activity, and thought processes          "

## 2022-03-10 ENCOUNTER — MYC REFILL (OUTPATIENT)
Dept: FAMILY MEDICINE | Facility: CLINIC | Age: 27
End: 2022-03-10
Payer: COMMERCIAL

## 2022-03-10 DIAGNOSIS — F98.8 ADD (ATTENTION DEFICIT DISORDER) WITHOUT HYPERACTIVITY: ICD-10-CM

## 2022-03-10 RX ORDER — DEXTROAMPHETAMINE SACCHARATE, AMPHETAMINE ASPARTATE, DEXTROAMPHETAMINE SULFATE AND AMPHETAMINE SULFATE 1.25; 1.25; 1.25; 1.25 MG/1; MG/1; MG/1; MG/1
TABLET ORAL
Qty: 30 TABLET | Refills: 0 | Status: SHIPPED | OUTPATIENT
Start: 2022-03-10 | End: 2022-04-10

## 2022-03-10 RX ORDER — DEXTROAMPHETAMINE SACCHARATE, AMPHETAMINE ASPARTATE MONOHYDRATE, DEXTROAMPHETAMINE SULFATE AND AMPHETAMINE SULFATE 3.75; 3.75; 3.75; 3.75 MG/1; MG/1; MG/1; MG/1
15 CAPSULE, EXTENDED RELEASE ORAL DAILY
Qty: 30 CAPSULE | Refills: 0 | Status: SHIPPED | OUTPATIENT
Start: 2022-03-10 | End: 2022-03-16

## 2022-03-10 NOTE — TELEPHONE ENCOUNTER
Medication: Adderall 5 mg    Medication:  Adderall XR 15 mg      CSA in last year: YES    Random Utox in last year: YES    On opioids in addition to benzodiazepines? NO    PROVIDER TO PULL THE FOLLOWING FROM  :    1. Last date filled?  2.   Only PCP Prescribing?  3.   Taken as prescribed from physician notes?

## 2022-03-10 NOTE — TELEPHONE ENCOUNTER
Routing refill request to provider for review/approval because:  Controlled substance.     Last Written Prescription Date:  2/7/2022  Last Fill Quantity: 30,  # refills: 0   Last office visit provider:  2/15/2022     Requested Prescriptions   Pending Prescriptions Disp Refills     amphetamine-dextroamphetamine (ADDERALL XR) 15 MG 24 hr capsule 30 capsule 0     Sig: Take 1 capsule (15 mg) by mouth daily Due for Follow up visit before further refills - maybe evisit       There is no refill protocol information for this order     Last Written Prescription Date:  2/7/2022  Last Fill Quantity: 30,  # refills: 0   Last office visit provider:  2/15/2022        amphetamine-dextroamphetamine (ADDERALL) 5 MG tablet 30 tablet 0     Sig: Take one in the afternoon as needed. Due for Follow up visit before further refills - maybe evisit       There is no refill protocol information for this order          Vivienne Phoenix, MARINE 03/10/22 1:03 PM

## 2022-03-11 NOTE — TELEPHONE ENCOUNTER
01/07/2022  1   01/04/2022  Dextroamp-Amphetamine 5 MG Tab    30.00  30 Ma Win   [id number]   St (1118)   [place]   [place]   01/07/2022  1   01/04/2022  Dextroamp-Amphet Er 15 MG Cap    30.00  30 Ma Win   [id number]   St (1118)   [place]   [place]   11/26/2021  1   11/26/2021  Dextroamp-Amphetamine 5 MG Tab    30.00  30 Ma Win   [id number]   St (1118)   0/0   [place]   11/26/2021  1   11/26/2021  Adderall Xr 15 MG Capsule    30.00  30 Ma Win   [id number]   St (1118)   0/0   [place]   10/20/2021  1   10/19/2021  Dextroamp-Amphetamine 5 MG Tab    30.00  30 Ma Win   [id number]   St (1118)   0/0   [place]

## 2022-03-16 DIAGNOSIS — F90.0 ADHD (ATTENTION DEFICIT HYPERACTIVITY DISORDER), INATTENTIVE TYPE: Primary | ICD-10-CM

## 2022-03-16 DIAGNOSIS — F98.8 ADD (ATTENTION DEFICIT DISORDER) WITHOUT HYPERACTIVITY: ICD-10-CM

## 2022-03-16 RX ORDER — DEXTROAMPHETAMINE SACCHARATE, AMPHETAMINE ASPARTATE MONOHYDRATE, DEXTROAMPHETAMINE SULFATE AND AMPHETAMINE SULFATE 3.75; 3.75; 3.75; 3.75 MG/1; MG/1; MG/1; MG/1
15 CAPSULE, EXTENDED RELEASE ORAL DAILY
Qty: 30 CAPSULE | Refills: 0 | Status: SHIPPED | OUTPATIENT
Start: 2022-03-16 | End: 2022-04-10

## 2022-03-16 NOTE — TELEPHONE ENCOUNTER
[facility] called in and DX on the adderall doesn't work.  I read the dx on the script and she said normally with that it would be f90.0 and she updated per insurance request.    If not ok then please call [facility]      Please update dx for future scripts.  Pt has new insurance.

## 2022-04-10 ENCOUNTER — MYC REFILL (OUTPATIENT)
Dept: FAMILY MEDICINE | Facility: CLINIC | Age: 27
End: 2022-04-10
Payer: COMMERCIAL

## 2022-04-10 DIAGNOSIS — F98.8 ADD (ATTENTION DEFICIT DISORDER) WITHOUT HYPERACTIVITY: ICD-10-CM

## 2022-04-10 DIAGNOSIS — F90.0 ADHD (ATTENTION DEFICIT HYPERACTIVITY DISORDER), INATTENTIVE TYPE: ICD-10-CM

## 2022-04-12 NOTE — TELEPHONE ENCOUNTER
Routing refill request to provider for review/approval because:  Controlled Substance Request    Last Written Prescription Date:  03/16/22  Last Fill Quantity: 30,  # refills: 0   Last office visit provider:  02/15/22     Last Written Prescription Date:  03/10/22  Last Fill Quantity: 30,  # refills: 0   Last office visit provider:  02/15/22     Requested Prescriptions   Pending Prescriptions Disp Refills     amphetamine-dextroamphetamine (ADDERALL) 5 MG tablet 30 tablet 0     Sig: Take one in the afternoon as needed.       There is no refill protocol information for this order        amphetamine-dextroamphetamine (ADDERALL XR) 15 MG 24 hr capsule 30 capsule 0     Sig: Take 1 capsule (15 mg) by mouth daily       There is no refill protocol information for this order          Jeri Cooley RN 04/12/22 2:42 PM

## 2022-04-13 RX ORDER — DEXTROAMPHETAMINE SACCHARATE, AMPHETAMINE ASPARTATE MONOHYDRATE, DEXTROAMPHETAMINE SULFATE AND AMPHETAMINE SULFATE 3.75; 3.75; 3.75; 3.75 MG/1; MG/1; MG/1; MG/1
15 CAPSULE, EXTENDED RELEASE ORAL DAILY
Qty: 30 CAPSULE | Refills: 0 | Status: SHIPPED | OUTPATIENT
Start: 2022-04-13 | End: 2022-06-16

## 2022-04-13 RX ORDER — DEXTROAMPHETAMINE SACCHARATE, AMPHETAMINE ASPARTATE, DEXTROAMPHETAMINE SULFATE AND AMPHETAMINE SULFATE 1.25; 1.25; 1.25; 1.25 MG/1; MG/1; MG/1; MG/1
TABLET ORAL
Qty: 30 TABLET | Refills: 0 | Status: SHIPPED | OUTPATIENT
Start: 2022-04-13 | End: 2022-06-16

## 2022-04-13 NOTE — TELEPHONE ENCOUNTER
Controlled Substance Refill Request for Adderall, Adderall XR    Last refill: Adderall - 3/10/2022 for 30 with 0        Adderall XR - 3/16/2022 for 30 with 0    Last clinic visit: 2/15/2022     Clinic visit frequency required: Q 6  months  Next appt: Nothing scheduled    Controlled substance agreement on file: Yes:  Date 2/28/2022.    Documentation in problem list reviewed:  Yes    Processing:  Rx to be electronically transmitted to pharmacy by provider      Onofre Doshi RN  [facility]

## 2022-05-15 ENCOUNTER — HEALTH MAINTENANCE LETTER (OUTPATIENT)
Age: 27
End: 2022-05-15

## 2022-06-16 ENCOUNTER — MYC REFILL (OUTPATIENT)
Dept: FAMILY MEDICINE | Facility: CLINIC | Age: 27
End: 2022-06-16
Payer: COMMERCIAL

## 2022-06-16 DIAGNOSIS — F90.0 ADHD (ATTENTION DEFICIT HYPERACTIVITY DISORDER), INATTENTIVE TYPE: ICD-10-CM

## 2022-06-16 DIAGNOSIS — Z79.899 CONTROLLED SUBSTANCE AGREEMENT SIGNED: ICD-10-CM

## 2022-06-16 DIAGNOSIS — F98.8 ADD (ATTENTION DEFICIT DISORDER) WITHOUT HYPERACTIVITY: ICD-10-CM

## 2022-06-17 NOTE — TELEPHONE ENCOUNTER
Routing refill request to provider for review/approval because:  Controlled Substance Request.    Last Written Prescription Date:  04/13/2022-both doses  Last Fill Quantity: 30,  # refills: 0   Last office visit provider:  02/15/2022 with Dr Robles.    Requested Prescriptions   Pending Prescriptions Disp Refills     amphetamine-dextroamphetamine (ADDERALL) 5 MG tablet 30 tablet 0     Sig: Take one in the afternoon as needed.       There is no refill protocol information for this order        amphetamine-dextroamphetamine (ADDERALL XR) 15 MG 24 hr capsule 30 capsule 0     Sig: Take 1 capsule (15 mg) by mouth daily       There is no refill protocol information for this order          Minda Kincaid 06/17/22 1:05 AM

## 2022-06-21 RX ORDER — DEXTROAMPHETAMINE SACCHARATE, AMPHETAMINE ASPARTATE MONOHYDRATE, DEXTROAMPHETAMINE SULFATE AND AMPHETAMINE SULFATE 3.75; 3.75; 3.75; 3.75 MG/1; MG/1; MG/1; MG/1
15 CAPSULE, EXTENDED RELEASE ORAL DAILY
Qty: 30 CAPSULE | Refills: 0 | Status: SHIPPED | OUTPATIENT
Start: 2022-06-21 | End: 2022-07-21

## 2022-06-21 RX ORDER — DEXTROAMPHETAMINE SACCHARATE, AMPHETAMINE ASPARTATE, DEXTROAMPHETAMINE SULFATE AND AMPHETAMINE SULFATE 1.25; 1.25; 1.25; 1.25 MG/1; MG/1; MG/1; MG/1
TABLET ORAL
Qty: 30 TABLET | Refills: 0 | Status: SHIPPED | OUTPATIENT
Start: 2022-06-21 | End: 2022-07-21

## 2022-06-21 NOTE — TELEPHONE ENCOUNTER
Diagnosis ADHD  Medication : Adderall XR 15 mg, adderall 5 mg  Quantity per month: 30 each  No of refills before follow up visit: 6 months - [facility]  CSA signed : 2/15/22  Urine drug screen: 2/15/22      05/14/2022  1   04/13/2022  Dextroamp-Amphetamine 5 MG Tab    30.00  30 Je Brayan   [id number]   St (1118)   [place]   05/14/2022  1   04/13/2022  Adderall Xr 15 MG Capsule    30.00  30 Je Brayan   [id number]   St (1118)   [place]   03/16/2022  1   03/10/2022  Dextroamp-Amphetamine 5 MG Tab    30.00  30 Ma Win   [id number]   St (1118)   [place]   03/16/2022  1   03/10/2022  Adderall Xr 15 MG Capsule    30.00  30 Ma Win   [id number]   St (1118)   [place]   01/07/2022  1   01/04/2022  Dextroamp-Amphetamine 5 MG Tab    30.00  30 Ma Win   [id number]   St (1118)   [place]   01/07/2022  1   01/04/2022  Dextroamp-Amphet Er 15 MG Cap    30.00  30 Ma Win   [id number]   St (1118)   [place]   11/26/2021  1   11/26/2021  Adderall Xr 15 MG Capsule    30.00  30 Ma Win   [id number]   St (1118)   [place]

## 2022-07-21 ENCOUNTER — MYC REFILL (OUTPATIENT)
Dept: FAMILY MEDICINE | Facility: CLINIC | Age: 27
End: 2022-07-21

## 2022-07-21 DIAGNOSIS — F90.0 ADHD (ATTENTION DEFICIT HYPERACTIVITY DISORDER), INATTENTIVE TYPE: ICD-10-CM

## 2022-07-21 DIAGNOSIS — F98.8 ADD (ATTENTION DEFICIT DISORDER) WITHOUT HYPERACTIVITY: ICD-10-CM

## 2022-07-22 RX ORDER — DEXTROAMPHETAMINE SACCHARATE, AMPHETAMINE ASPARTATE, DEXTROAMPHETAMINE SULFATE AND AMPHETAMINE SULFATE 1.25; 1.25; 1.25; 1.25 MG/1; MG/1; MG/1; MG/1
TABLET ORAL
Qty: 30 TABLET | Refills: 0 | Status: SHIPPED | OUTPATIENT
Start: 2022-07-22 | End: 2022-09-12

## 2022-07-22 RX ORDER — DEXTROAMPHETAMINE SACCHARATE, AMPHETAMINE ASPARTATE MONOHYDRATE, DEXTROAMPHETAMINE SULFATE AND AMPHETAMINE SULFATE 3.75; 3.75; 3.75; 3.75 MG/1; MG/1; MG/1; MG/1
15 CAPSULE, EXTENDED RELEASE ORAL DAILY
Qty: 30 CAPSULE | Refills: 0 | Status: SHIPPED | OUTPATIENT
Start: 2022-07-22 | End: 2022-09-12

## 2022-07-22 NOTE — TELEPHONE ENCOUNTER
06/21/2022  1   06/21/2022  Adderall Xr 15 MG Capsule    30.00  30 Ma Win   [id number]   St (1118)   [place]   06/21/2022  1   06/21/2022  Dextroamp-Amphetamine 5 MG Tab    30.00  30 Ma Win   [id number]   St (1118)   [place]   05/14/2022  1   04/13/2022  Dextroamp-Amphetamine 5 MG Tab    30.00  30 Je Brayan   [id number]   St (1118)   [place]   05/14/2022  1   04/13/2022  Adderall Xr 15 MG Capsule    30.00  30 Je Brayan   [id number]   St (1118)   [place]   03/16/2022  1   03/10/2022  Dextroamp-Amphetamine 5 MG Tab    30.00  30 Ma Win   [id number]   St (1118)   [place]   03/16/2022  1   03/10/2022  Adderall Xr 15 MG Capsule    30.00  30 Ma Win   [id number]   St (1118)   0/0   [place]     Diagnosis ADHD  Medication : Adderall XR 15 mg, adderall 5 mg  Quantity per month: 30 each  No of refills before follow up visit: 6 months - may be by [facility] for one of follow up visits  CSA signed : 2/15/22  Urine drug screen: 2/15/22

## 2022-09-10 ENCOUNTER — HEALTH MAINTENANCE LETTER (OUTPATIENT)
Age: 27
End: 2022-09-10

## 2022-09-12 ENCOUNTER — MYC REFILL (OUTPATIENT)
Dept: FAMILY MEDICINE | Facility: CLINIC | Age: 27
End: 2022-09-12

## 2022-09-12 DIAGNOSIS — F98.8 ADD (ATTENTION DEFICIT DISORDER) WITHOUT HYPERACTIVITY: ICD-10-CM

## 2022-09-12 DIAGNOSIS — F90.0 ADHD (ATTENTION DEFICIT HYPERACTIVITY DISORDER), INATTENTIVE TYPE: ICD-10-CM

## 2022-09-13 RX ORDER — DEXTROAMPHETAMINE SACCHARATE, AMPHETAMINE ASPARTATE MONOHYDRATE, DEXTROAMPHETAMINE SULFATE AND AMPHETAMINE SULFATE 3.75; 3.75; 3.75; 3.75 MG/1; MG/1; MG/1; MG/1
15 CAPSULE, EXTENDED RELEASE ORAL DAILY
Qty: 30 CAPSULE | Refills: 0 | Status: SHIPPED | OUTPATIENT
Start: 2022-09-13 | End: 2022-10-13

## 2022-09-13 RX ORDER — DEXTROAMPHETAMINE SACCHARATE, AMPHETAMINE ASPARTATE, DEXTROAMPHETAMINE SULFATE AND AMPHETAMINE SULFATE 1.25; 1.25; 1.25; 1.25 MG/1; MG/1; MG/1; MG/1
TABLET ORAL
Qty: 30 TABLET | Refills: 0 | Status: SHIPPED | OUTPATIENT
Start: 2022-09-13 | End: 2022-10-13

## 2022-09-13 NOTE — TELEPHONE ENCOUNTER
Diagnosis ADHD  Medication : Adderall XR 15 mg, adderall 5 mg  Quantity per month: 30 each  No of refills before follow up visit: 6 months - may be by [facility] for one of follow up visits  CSA signed : 2/15/22  Urine drug screen: 2/15/22    Last refilled 7/22/22 - need Follow up b4 further refills  - will message her    07/30/2022  1   07/22/2022  Adderall Xr 15 MG Capsule    30.00  30 Ma Win   [id number]   St (1118)   [place]   07/30/2022  1   07/22/2022  Dextroamp-Amphetamine 5 MG Tab    30.00  30 Ma Win   [id number]   St (1118)   [place]   06/21/2022  1   06/21/2022  Adderall Xr 15 MG Capsule    30.00  30 Ma Win   [id number]   St (1118)   [place]   06/21/2022  1   06/21/2022  Dextroamp-Amphetamine 5 MG Tab    30.00  30 Ma Win   [id number]   St (1118)   [place]   05/14/2022  1   04/13/2022  Dextroamp-Amphetamine 5 MG Tab    30.00  30 Je Brayan   [id number]   St (1118)   [place]   05/14/2022  1   04/13/2022  Adderall Xr 15 MG Capsule    30.00  30 Je Brayan   [id number]   St (1118)   0/0   [place]

## 2022-10-13 ENCOUNTER — VIRTUAL VISIT (OUTPATIENT)
Dept: FAMILY MEDICINE | Facility: CLINIC | Age: 27
End: 2022-10-13
Payer: COMMERCIAL

## 2022-10-13 ENCOUNTER — NURSE TRIAGE (OUTPATIENT)
Dept: NURSING | Facility: CLINIC | Age: 27
End: 2022-10-13

## 2022-10-13 DIAGNOSIS — F90.0 ADHD (ATTENTION DEFICIT HYPERACTIVITY DISORDER), INATTENTIVE TYPE: Primary | ICD-10-CM

## 2022-10-13 PROCEDURE — 99441 PR PHYSICIAN TELEPHONE EVALUATION 5-10 MIN: CPT | Performed by: FAMILY MEDICINE

## 2022-10-13 RX ORDER — DEXTROAMPHETAMINE SACCHARATE, AMPHETAMINE ASPARTATE, DEXTROAMPHETAMINE SULFATE AND AMPHETAMINE SULFATE 1.25; 1.25; 1.25; 1.25 MG/1; MG/1; MG/1; MG/1
TABLET ORAL
Qty: 30 TABLET | Refills: 0 | Status: SHIPPED | OUTPATIENT
Start: 2022-10-13

## 2022-10-13 RX ORDER — DEXTROAMPHETAMINE SACCHARATE, AMPHETAMINE ASPARTATE MONOHYDRATE, DEXTROAMPHETAMINE SULFATE AND AMPHETAMINE SULFATE 3.75; 3.75; 3.75; 3.75 MG/1; MG/1; MG/1; MG/1
15 CAPSULE, EXTENDED RELEASE ORAL DAILY
Qty: 30 CAPSULE | Refills: 0 | Status: SHIPPED | OUTPATIENT
Start: 2022-10-13

## 2022-10-13 NOTE — TELEPHONE ENCOUNTER
"Pt is calling.    Returning call from \"someone\". Not sure who.  Nothing noted in chart.  Per scheduling, may be pre-registration or someone from the clinic calling regarding her telephone visit for today at 5:40pm.    I advised her that they will call her back if needed, otherwise, be ready for telephone visit at 5:20pm today.      Minda Kincaid RN  [facility] Nurse Advisor  10/13/2022 at 12:52 PM        Reason for Disposition    Information only question and nurse able to answer    Additional Information    Negative: Nursing judgment    Negative: Nursing judgment    Negative: Nursing judgment    Negative: Nursing judgment    Protocols used: INFORMATION ONLY CALL - NO TRIAGE-A-OH      "

## 2022-10-13 NOTE — PROGRESS NOTES
Aidee is a 26 year old who is being evaluated via a billable telephone visit.      What phone number would you like to be contacted at? [phone]  How would you like to obtain your AVS? MyChart       Assessment & Plan     ADHD (attention deficit hyperactivity disorder), inattentive type  Cpntinue  - amphetamine-dextroamphetamine (ADDERALL XR) 15 MG 24 hr capsule  Dispense: 30 capsule; Refill: 0  - amphetamine-dextroamphetamine (ADDERALL) 5 MG tablet  Dispense: 30 tablet; Refill: 0    CSA signed : 2/15/22  Urine drug screen: 2/15/22    Return in about 6 months (around 4/13/2023) for Routine preventive.    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 She will take the  Bar in July 2023    ADHD  Adderall  Is working well for her; once  in a while skips her afternoon IR pill    ROS  How much sleep do you get on a typical night?  Good about trying to get 7 hours - no trouble sleeping     Do you have any trouble sleeping?   no not normally, but sometimes after a  long day     Do use drink caffeinated beverages , and if so how many in a day on average?  One cup in the am, 1-2 diet cokes in afternoon  - cut back diet coke a little      Are having any heart palpitations?  no     Are you having any decreased appetite?  no     Have you been losing any weight?  Holding steady     What does your medication help you do at work and at home?  Get sluggish without it    Preventive   - Pap at next visit - in person annual exam with ADHD Follow up    - She will get vaccines at her pharmacy          Objective           Vitals:  No vitals were obtained today due to virtual visit.    Physical Exam     PSYCH: Alert and oriented times 3; coherent speech, normal   rate and volume, able to  articulate logical thoughts, able   to abstract reason, no tangential thoughts, no hallucinations   or delusions  Her affect is normal  RESP: No cough, no audible wheezing, able to talk in full sentences  Remainder of exam unable to be completed due to telephone visits        Phone call duration: 10 minutes

## 2022-11-14 ENCOUNTER — MYC REFILL (OUTPATIENT)
Dept: FAMILY MEDICINE | Facility: CLINIC | Age: 27
End: 2022-11-14

## 2022-11-14 DIAGNOSIS — F90.0 ADHD (ATTENTION DEFICIT HYPERACTIVITY DISORDER), INATTENTIVE TYPE: ICD-10-CM

## 2022-11-14 RX ORDER — DEXTROAMPHETAMINE SACCHARATE, AMPHETAMINE ASPARTATE MONOHYDRATE, DEXTROAMPHETAMINE SULFATE AND AMPHETAMINE SULFATE 3.75; 3.75; 3.75; 3.75 MG/1; MG/1; MG/1; MG/1
15 CAPSULE, EXTENDED RELEASE ORAL DAILY
Qty: 30 CAPSULE | Refills: 0 | Status: SHIPPED | OUTPATIENT
Start: 2022-12-15 | End: 2023-01-14

## 2022-11-14 RX ORDER — DEXTROAMPHETAMINE SACCHARATE, AMPHETAMINE ASPARTATE MONOHYDRATE, DEXTROAMPHETAMINE SULFATE AND AMPHETAMINE SULFATE 3.75; 3.75; 3.75; 3.75 MG/1; MG/1; MG/1; MG/1
15 CAPSULE, EXTENDED RELEASE ORAL DAILY
Qty: 30 CAPSULE | Refills: 0 | Status: CANCELLED | OUTPATIENT
Start: 2022-11-14

## 2022-11-14 RX ORDER — DEXTROAMPHETAMINE SACCHARATE, AMPHETAMINE ASPARTATE, DEXTROAMPHETAMINE SULFATE AND AMPHETAMINE SULFATE 1.25; 1.25; 1.25; 1.25 MG/1; MG/1; MG/1; MG/1
5 TABLET ORAL DAILY
Qty: 30 TABLET | Refills: 0 | Status: SHIPPED | OUTPATIENT
Start: 2022-11-14 | End: 2023-02-22

## 2022-11-14 RX ORDER — DEXTROAMPHETAMINE SACCHARATE, AMPHETAMINE ASPARTATE, DEXTROAMPHETAMINE SULFATE AND AMPHETAMINE SULFATE 1.25; 1.25; 1.25; 1.25 MG/1; MG/1; MG/1; MG/1
5 TABLET ORAL DAILY
Qty: 30 TABLET | Refills: 0 | Status: SHIPPED | OUTPATIENT
Start: 2022-12-15 | End: 2023-01-14

## 2022-11-14 RX ORDER — DEXTROAMPHETAMINE SACCHARATE, AMPHETAMINE ASPARTATE MONOHYDRATE, DEXTROAMPHETAMINE SULFATE AND AMPHETAMINE SULFATE 3.75; 3.75; 3.75; 3.75 MG/1; MG/1; MG/1; MG/1
15 CAPSULE, EXTENDED RELEASE ORAL DAILY
Qty: 30 CAPSULE | Refills: 0 | Status: SHIPPED | OUTPATIENT
Start: 2022-11-14 | End: 2023-02-22

## 2022-11-14 RX ORDER — DEXTROAMPHETAMINE SACCHARATE, AMPHETAMINE ASPARTATE, DEXTROAMPHETAMINE SULFATE AND AMPHETAMINE SULFATE 1.25; 1.25; 1.25; 1.25 MG/1; MG/1; MG/1; MG/1
5 TABLET ORAL DAILY
Qty: 30 TABLET | Refills: 0 | Status: SHIPPED | OUTPATIENT
Start: 2023-01-15 | End: 2023-02-14

## 2022-11-14 RX ORDER — DEXTROAMPHETAMINE SACCHARATE, AMPHETAMINE ASPARTATE, DEXTROAMPHETAMINE SULFATE AND AMPHETAMINE SULFATE 1.25; 1.25; 1.25; 1.25 MG/1; MG/1; MG/1; MG/1
TABLET ORAL
Qty: 30 TABLET | Refills: 0 | Status: CANCELLED | OUTPATIENT
Start: 2022-11-14

## 2022-11-14 RX ORDER — DEXTROAMPHETAMINE SACCHARATE, AMPHETAMINE ASPARTATE MONOHYDRATE, DEXTROAMPHETAMINE SULFATE AND AMPHETAMINE SULFATE 3.75; 3.75; 3.75; 3.75 MG/1; MG/1; MG/1; MG/1
15 CAPSULE, EXTENDED RELEASE ORAL DAILY
Qty: 30 CAPSULE | Refills: 0 | Status: SHIPPED | OUTPATIENT
Start: 2023-01-15 | End: 2023-02-14

## 2022-11-14 NOTE — TELEPHONE ENCOUNTER
Diagnosis ADHD  Medication : Adderall XR 15 mg, adderall 5 mg  Quantity per month: 30 each  No of refills before follow up visit: 6 months - [facility]  CSA signed : 2/15/22  Urine drug screen: 2/15/22    10/14/2022  1   10/13/2022  Dextroamp-Amphetamine 5 MG Tab  30.00  30 Ma Win   [id number]   St (1118)   [place]   10/14/2022  1   10/13/2022  Adderall Xr 15 MG Capsule  30.00  30 Ma Win   [id number]   St (1118)   [place]   09/13/2022  1   09/13/2022  Dextroamp-Amphetamine 5 MG Tab  30.00  30 Ma Win   [id number]   St (1118)   [place]   09/13/2022  1   09/13/2022  Adderall Xr 15 MG Capsule  30.00  30 Ma Win   [id number]   St (1118)   [place]     Last visit 10/13/22

## 2023-02-22 ENCOUNTER — MYC REFILL (OUTPATIENT)
Dept: FAMILY MEDICINE | Facility: CLINIC | Age: 28
End: 2023-02-22
Payer: COMMERCIAL

## 2023-02-22 DIAGNOSIS — F90.0 ADHD (ATTENTION DEFICIT HYPERACTIVITY DISORDER), INATTENTIVE TYPE: ICD-10-CM

## 2023-02-23 RX ORDER — DEXTROAMPHETAMINE SACCHARATE, AMPHETAMINE ASPARTATE MONOHYDRATE, DEXTROAMPHETAMINE SULFATE AND AMPHETAMINE SULFATE 3.75; 3.75; 3.75; 3.75 MG/1; MG/1; MG/1; MG/1
15 CAPSULE, EXTENDED RELEASE ORAL DAILY
Qty: 30 CAPSULE | Refills: 0 | Status: SHIPPED | OUTPATIENT
Start: 2023-02-24 | End: 2023-04-13

## 2023-02-23 RX ORDER — DEXTROAMPHETAMINE SACCHARATE, AMPHETAMINE ASPARTATE, DEXTROAMPHETAMINE SULFATE AND AMPHETAMINE SULFATE 1.25; 1.25; 1.25; 1.25 MG/1; MG/1; MG/1; MG/1
5 TABLET ORAL DAILY
Qty: 30 TABLET | Refills: 0 | Status: SHIPPED | OUTPATIENT
Start: 2023-02-24 | End: 2023-04-13

## 2023-02-23 NOTE — TELEPHONE ENCOUNTER
Diagnosis ADHD  Medication : Adderall XR 15 mg, adderall 5 mg  Quantity per month: 30 each  No of refills before follow up visit: 6 months - [facility]  CSA signed : 2/15/22  Urine drug screen: 2/15/22        01/25/2023  1   11/14/2022  Adderall Xr 15 MG Capsule  30.00  30 Ma Win   [id number]   St (1118)   [place]   12/23/2022  1   11/14/2022  Adderall Xr 15 MG Capsule  30.00  30 Ma Win   [id number]   St (1118)   [place]   12/23/2022  1   11/14/2022  Dextroamp-Amphetamine 5 MG Tab  30.00  30 Ma Win   [id number]   St (1118)   [place]     Last visit 10/13/22

## 2023-02-27 ENCOUNTER — TRANSFERRED RECORDS (OUTPATIENT)
Dept: HEALTH INFORMATION MANAGEMENT | Facility: CLINIC | Age: 28
End: 2023-02-27

## 2023-05-30 ENCOUNTER — OFFICE VISIT (OUTPATIENT)
Dept: FAMILY MEDICINE | Facility: CLINIC | Age: 28
End: 2023-05-30
Payer: COMMERCIAL

## 2023-05-30 VITALS
BODY MASS INDEX: 23.81 KG/M2 | HEART RATE: 71 BPM | HEIGHT: 65 IN | OXYGEN SATURATION: 97 % | SYSTOLIC BLOOD PRESSURE: 118 MMHG | DIASTOLIC BLOOD PRESSURE: 68 MMHG | RESPIRATION RATE: 18 BRPM | TEMPERATURE: 98.1 F | WEIGHT: 142.9 LBS

## 2023-05-30 DIAGNOSIS — F90.0 ADHD (ATTENTION DEFICIT HYPERACTIVITY DISORDER), INATTENTIVE TYPE: Primary | ICD-10-CM

## 2023-05-30 DIAGNOSIS — Z79.899 CONTROLLED SUBSTANCE AGREEMENT SIGNED: ICD-10-CM

## 2023-05-30 LAB
AMPHETAMINES UR QL SCN: NORMAL
BARBITURATES UR QL SCN: NORMAL
BENZODIAZ UR QL SCN: NORMAL
BZE UR QL SCN: NORMAL
CANNABINOIDS UR QL SCN: NORMAL
CREAT UR-MCNC: 10.6 MG/DL
OPIATES UR QL SCN: NORMAL
OXYCODONE UR QL: NORMAL
PCP QUAL URINE (ROCHE): NORMAL

## 2023-05-30 PROCEDURE — 99213 OFFICE O/P EST LOW 20 MIN: CPT | Performed by: FAMILY MEDICINE

## 2023-05-30 PROCEDURE — 80307 DRUG TEST PRSMV CHEM ANLYZR: CPT | Performed by: FAMILY MEDICINE

## 2023-05-30 RX ORDER — DEXTROAMPHETAMINE SACCHARATE, AMPHETAMINE ASPARTATE MONOHYDRATE, DEXTROAMPHETAMINE SULFATE AND AMPHETAMINE SULFATE 3.75; 3.75; 3.75; 3.75 MG/1; MG/1; MG/1; MG/1
15 CAPSULE, EXTENDED RELEASE ORAL DAILY
Qty: 30 CAPSULE | Refills: 0 | Status: SHIPPED | OUTPATIENT
Start: 2023-05-30

## 2023-05-30 RX ORDER — DEXTROAMPHETAMINE SACCHARATE, AMPHETAMINE ASPARTATE, DEXTROAMPHETAMINE SULFATE AND AMPHETAMINE SULFATE 2.5; 2.5; 2.5; 2.5 MG/1; MG/1; MG/1; MG/1
5 TABLET ORAL DAILY
Qty: 15 TABLET | Refills: 0 | Status: SHIPPED | OUTPATIENT
Start: 2023-05-30 | End: 2023-08-21

## 2023-05-30 NOTE — LETTER
[facility]  05/30/23  Patient: Aidee Cortes  YOB: 1995  Medical Record Number: [id number]                                                                                  Non-Opioid Controlled Substance Agreement    This is an agreement between you and your provider regarding safe and appropriate use of controlled substances prescribed by your care team. Controlled substances are?medicines that can cause physical and mental dependence (abuse).     There are strict laws about having and using these medicines. We here at [facility] are  committed to working with you in your efforts to get better. To support you in this work, we'll help you schedule regular office appointments for medicine refills. If we must cancel or change your appointment for any reason, we'll make sure you have enough medicine to last until your next appointment.     As a Provider, I will:   Listen carefully to your concerns while treating you with respect.   Recommend a treatment plan that I believe is in your best interest and may involve therapies other than medicine.    Talk with you often about the possible benefits and the risk of harm of any medicine that we prescribe for you.  Assess the safety of this medicine and check how well it works.    Provide a plan on how to taper (discontinue or go off) using this medicine if the decision is made to stop its use.      ::  As a Patient, I understand controlled substances:     Are prescribed by my care provider to help me function or work and manage my condition(s).?  Are strong medicines and can cause serious side effects.     Need to be taken exactly as prescribed.?Combining controlled substances with certain medicines or chemicals (such as illegal drugs, alcohol, sedatives, sleeping pills, and benzodiazepines) can be dangerous or even fatal.? If I stop taking my medicines suddenly, I may have severe withdrawal symptoms.     Diagnosis:  ADHD  Medication : 1) Adderall xr 15 mg po daily 2) Adderall 5 mg po prn daily  Quantity per month: 1) 30 2)30  Number of refills before follow up visit: 6 months    The risks, benefits, and side effects of these medicine(s) were explained to me. I agree that:    I will take part in other treatments as advised by my care team. This may be psychiatry or counseling, physical therapy, behavioral therapy, group treatment or a referral to specialist.  I will keep all my appointments and understand this is part of the monitoring of controlled substances.?My care team may require an office visit for EVERY controlled substance refill. If I miss appointments or don t follow instructions, my care team may stop my medicine.  I will take my medicines as prescribed. I will not change the dose or schedule unless my care team tells me to. There will be no refills if I run out early.      I may be asked to come to the clinic and complete a urine drug test or complete a pill count. If I don t give a urine sample or participate in a pill count, the care team may stop my medicine.  I will only receive controlled substance prescriptions from this clinic. If I am treated by another provider, I will tell them that I am taking controlled substances and that I have a treatment agreement with this provider. I will inform my [facility] care team within one business day if I am given a prescription for any controlled substance by another healthcare provider. My [facility] care team can contact other providers and pharmacists about my use of any medicines.  It is up to me to make sure that I don't run out of my medicines on weekends or holidays.?If my care team is willing to refill my prescription without a visit, I must request refills only during office hours. Refills may take up to 3 business days to process. I will use one pharmacy to fill all my controlled substance prescriptions. I will notify the clinic about any changes  to my insurance or medicine availability.  I am responsible for my prescriptions. If the medicine/prescription is lost, stolen or destroyed, it will not be replaced.?I also agree not to share controlled substance medicines with anyone.   I am aware I should not use any illegal or recreational drugs. I agree not to drink alcohol unless my care team says I can.   If I enroll in the [place] Medical Cannabis program, I will tell my care team before my next refill.  I will tell my care team right away if I become pregnant, have a new medical problem treated outside of my regular clinic, or have a change in my medicines.   I understand that this medicine can affect my thinking, judgment and reaction time.? Alcohol and drugs affect the brain and body, which can affect the safety of my driving. Being under the influence of alcohol or drugs can affect my decision-making, behaviors, personal safety and the safety of others. Driving while impaired (DWI) can occur if a person is driving, operating or in physical control of a car, motorcycle, boat, snowmobile, ATV, motorbike, off-road vehicle or any other motor vehicle ([place] Statute 169A.20). I understand the risk if I choose to drive or operate any vehicle or machinery.    I understand that if I do not follow any of the conditions above, my prescriptions or treatment may be stopped or changed.   I agree that my provider, clinic care team and pharmacy may work with any city, state or federal law enforcement agency that investigates the misuse, sale or other diversion of my controlled medicine. I will allow my provider to discuss my care with, or share a copy of, this agreement with any other treating provider, pharmacy or emergency room where I receive care.     I have read this agreement and have asked questions about anything I did not understand.    ________________________________________________________  Patient Signature - Aidee Cortes     ___________________                    Date     ________________________________________________________  Provider Signature - Yesy [place], MD       ___________________                   Date     ________________________________________________________  Witness Signature (required if provider not present while patient signing)          ___________________                   Date

## 2023-05-30 NOTE — PROGRESS NOTES
Assessment & Plan     ADHD (attention deficit hyperactivity disorder), inattentive type  Well controlled on:   - amphetamine-dextroamphetamine (ADDERALL XR) 15 MG 24 hr capsule  Dispense: 30 capsule; Refill: 0  - amphetamine-dextroamphetamine (ADDERALL) 10 MG tablet  Dispense: 15 tablet; Refill: 0    Controlled substance agreement signed  - Urine Drugs of Abuse Screen Panel 1 - Drug Screen (Full)    Return for SOON, for preventive visit.      Khushi Robles MD  [facility]   Aidee is a 27 year old, presenting for the following health issues:  Recheck Medication (Medication check and follow-up) and A.D.H.D (Follow-up)        5/30/2023     1:34 PM   Additional Questions   Roomed by Rosey   Accompanied by N/A         5/30/2023     1:35 PM   Patient Reported Additional Medications   Patient reports taking the following new medications inhaler from urgent care     A.D.H.D    History of Present Illness       Reason for visit:  Physical    She eats 2-3 servings of fruits and vegetables daily.She consumes 1 sweetened beverage(s) daily.She exercises with enough effort to increase her heart rate 10 to 19 minutes per day.  She exercises with enough effort to increase her heart rate 5 days per week. She is missing 3 dose(s) of medications per week.         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 She has a job lined up with the [employer], but will  "have 6 weeks after the Bar and before starting her job to relax      Objective    /68 (BP Location: Left arm, Patient Position: Sitting, Cuff Size: Adult Regular)   Pulse 71   Temp 98.1  F (36.7  C) (Tympanic)   Resp 18   Ht 1.651 m (5' 5\")   Wt 64.8 kg (142 lb 14.4 oz)   LMP 05/20/2023 (Exact Date)   SpO2 (!) 9%   BMI 23.78 kg/m    Body mass index is 23.78 kg/m .  Physical Exam   General appearance - alert, well appearing, and in no distress  Mental status - normal mood, behavior, speech, dress, motor activity, and thought processes  Chest - clear to auscultation, no wheezes, rales or rhonchi, symmetric air entry  Heart - normal rate, regular rhythm, normal S1, S2, no murmurs, rubs, clicks or gallops              "

## 2023-06-03 ENCOUNTER — HEALTH MAINTENANCE LETTER (OUTPATIENT)
Age: 28
End: 2023-06-03

## 2023-07-10 ENCOUNTER — MYC REFILL (OUTPATIENT)
Dept: FAMILY MEDICINE | Facility: CLINIC | Age: 28
End: 2023-07-10
Payer: COMMERCIAL

## 2023-07-10 DIAGNOSIS — F90.0 ADHD (ATTENTION DEFICIT HYPERACTIVITY DISORDER), INATTENTIVE TYPE: ICD-10-CM

## 2023-07-10 DIAGNOSIS — Z79.899 CONTROLLED SUBSTANCE AGREEMENT SIGNED: ICD-10-CM

## 2023-07-10 RX ORDER — DEXTROAMPHETAMINE SACCHARATE, AMPHETAMINE ASPARTATE MONOHYDRATE, DEXTROAMPHETAMINE SULFATE AND AMPHETAMINE SULFATE 3.75; 3.75; 3.75; 3.75 MG/1; MG/1; MG/1; MG/1
15 CAPSULE, EXTENDED RELEASE ORAL DAILY
Qty: 30 CAPSULE | Refills: 0 | Status: CANCELLED | OUTPATIENT
Start: 2023-07-10

## 2023-07-11 RX ORDER — DEXTROAMPHETAMINE SACCHARATE, AMPHETAMINE ASPARTATE MONOHYDRATE, DEXTROAMPHETAMINE SULFATE AND AMPHETAMINE SULFATE 3.75; 3.75; 3.75; 3.75 MG/1; MG/1; MG/1; MG/1
15 CAPSULE, EXTENDED RELEASE ORAL DAILY
Qty: 30 CAPSULE | Refills: 0 | Status: SHIPPED | OUTPATIENT
Start: 2023-07-11 | End: 2023-08-21

## 2023-07-11 RX ORDER — DEXTROAMPHETAMINE SACCHARATE, AMPHETAMINE ASPARTATE MONOHYDRATE, DEXTROAMPHETAMINE SULFATE AND AMPHETAMINE SULFATE 3.75; 3.75; 3.75; 3.75 MG/1; MG/1; MG/1; MG/1
15 CAPSULE, EXTENDED RELEASE ORAL DAILY
Qty: 30 CAPSULE | Refills: 0 | Status: SHIPPED | OUTPATIENT
Start: 2023-08-11 | End: 2023-09-10

## 2023-07-11 RX ORDER — DEXTROAMPHETAMINE SACCHARATE, AMPHETAMINE ASPARTATE MONOHYDRATE, DEXTROAMPHETAMINE SULFATE AND AMPHETAMINE SULFATE 3.75; 3.75; 3.75; 3.75 MG/1; MG/1; MG/1; MG/1
15 CAPSULE, EXTENDED RELEASE ORAL DAILY
Qty: 30 CAPSULE | Refills: 0 | Status: SHIPPED | OUTPATIENT
Start: 2023-09-11 | End: 2023-10-11

## 2023-07-11 NOTE — TELEPHONE ENCOUNTER
06/01/2023 05/30/2023   1  Adderall Xr 15 Mg Capsule 30.00  30  Ma Win  [id number]   St ([id number])  0/0   [place]  [place]    04/20/2023 04/14/2023   1  Adderall Xr 15 Mg Capsule 30.00  30  Ma Win  [id number]   St ([id number])  0/0   [place]    03/07/2023 02/23/2023   1  Adderall Xr 15 Mg Capsule 30.00  30  Ma Win  [id number]   St ([id number])  0/0   [place]  [place]      Last drug screen did not show amphtamiens, but she does not take daily    Diagnosis ADHD  Medication : Adderall XR 15 mg, adderall 5 mg  Quantity per month: 30 each  No of refills before follow up visit: 6 months - may be by [facility] for one of follow up visits  CSA signed : 5/30/23  Urine drug screen: 5/30/23

## 2023-09-12 ENCOUNTER — MYC MEDICAL ADVICE (OUTPATIENT)
Dept: FAMILY MEDICINE | Facility: CLINIC | Age: 28
End: 2023-09-12
Payer: COMMERCIAL

## 2023-09-12 DIAGNOSIS — F98.8 ADD (ATTENTION DEFICIT DISORDER) WITHOUT HYPERACTIVITY: Primary | ICD-10-CM

## 2023-09-14 RX ORDER — DEXTROAMPHETAMINE SACCHARATE, AMPHETAMINE ASPARTATE MONOHYDRATE, DEXTROAMPHETAMINE SULFATE AND AMPHETAMINE SULFATE 3.75; 3.75; 3.75; 3.75 MG/1; MG/1; MG/1; MG/1
15 CAPSULE, EXTENDED RELEASE ORAL DAILY
Qty: 60 CAPSULE | Refills: 0 | Status: SHIPPED | OUTPATIENT
Start: 2023-09-14 | End: 2023-12-14

## 2023-12-14 ENCOUNTER — MYC REFILL (OUTPATIENT)
Dept: FAMILY MEDICINE | Facility: CLINIC | Age: 28
End: 2023-12-14
Payer: COMMERCIAL

## 2023-12-14 DIAGNOSIS — F90.0 ADHD (ATTENTION DEFICIT HYPERACTIVITY DISORDER), INATTENTIVE TYPE: ICD-10-CM

## 2023-12-14 DIAGNOSIS — F98.8 ADD (ATTENTION DEFICIT DISORDER) WITHOUT HYPERACTIVITY: ICD-10-CM

## 2023-12-15 RX ORDER — DEXTROAMPHETAMINE SACCHARATE, AMPHETAMINE ASPARTATE, DEXTROAMPHETAMINE SULFATE AND AMPHETAMINE SULFATE 1.25; 1.25; 1.25; 1.25 MG/1; MG/1; MG/1; MG/1
5 TABLET ORAL DAILY
Qty: 15 TABLET | Refills: 0 | Status: SHIPPED | OUTPATIENT
Start: 2023-12-15

## 2023-12-15 RX ORDER — DEXTROAMPHETAMINE SACCHARATE, AMPHETAMINE ASPARTATE MONOHYDRATE, DEXTROAMPHETAMINE SULFATE AND AMPHETAMINE SULFATE 3.75; 3.75; 3.75; 3.75 MG/1; MG/1; MG/1; MG/1
15 CAPSULE, EXTENDED RELEASE ORAL DAILY
Qty: 60 CAPSULE | Refills: 0 | Status: SHIPPED | OUTPATIENT
Start: 2023-12-15

## 2024-07-07 ENCOUNTER — HEALTH MAINTENANCE LETTER (OUTPATIENT)
Age: 29
End: 2024-07-07

## 2025-07-13 ENCOUNTER — HEALTH MAINTENANCE LETTER (OUTPATIENT)
Age: 30
End: 2025-07-13